# Patient Record
Sex: FEMALE | Race: WHITE | Employment: UNEMPLOYED | URBAN - METROPOLITAN AREA
[De-identification: names, ages, dates, MRNs, and addresses within clinical notes are randomized per-mention and may not be internally consistent; named-entity substitution may affect disease eponyms.]

---

## 2018-09-07 ENCOUNTER — HOSPITAL ENCOUNTER (EMERGENCY)
Age: 38
Discharge: HOME | End: 2018-09-07
Attending: EMERGENCY MEDICINE
Payer: SELF-PAY

## 2018-09-07 VITALS
HEART RATE: 117 BPM | SYSTOLIC BLOOD PRESSURE: 105 MMHG | BODY MASS INDEX: 26.41 KG/M2 | TEMPERATURE: 98.2 F | HEIGHT: 59 IN | OXYGEN SATURATION: 100 % | RESPIRATION RATE: 20 BRPM | DIASTOLIC BLOOD PRESSURE: 65 MMHG | WEIGHT: 131 LBS

## 2018-09-07 DIAGNOSIS — O23.40 URINARY TRACT INFECTION IN MOTHER DURING PREGNANCY, ANTEPARTUM: ICD-10-CM

## 2018-09-07 DIAGNOSIS — R19.7 NAUSEA VOMITING AND DIARRHEA: Primary | ICD-10-CM

## 2018-09-07 DIAGNOSIS — R11.2 NAUSEA VOMITING AND DIARRHEA: Primary | ICD-10-CM

## 2018-09-07 LAB
ALBUMIN SERPL-MCNC: 2.9 G/DL (ref 3.4–5)
ALBUMIN/GLOB SERPL: 0.7 {RATIO} (ref 0.8–1.7)
ALP SERPL-CCNC: 70 U/L (ref 45–117)
ALT SERPL-CCNC: 28 U/L (ref 13–56)
ANION GAP SERPL CALC-SCNC: 12 MMOL/L (ref 3–18)
APPEARANCE UR: CLEAR
AST SERPL-CCNC: 18 U/L (ref 15–37)
BACTERIA URNS QL MICRO: ABNORMAL /HPF
BASOPHILS # BLD: 0 K/UL (ref 0–0.1)
BASOPHILS NFR BLD: 0 % (ref 0–2)
BILIRUB SERPL-MCNC: 0.4 MG/DL (ref 0.2–1)
BILIRUB UR QL: NEGATIVE
BUN SERPL-MCNC: 3 MG/DL (ref 7–18)
BUN/CREAT SERPL: 6 (ref 12–20)
CALCIUM SERPL-MCNC: 8.6 MG/DL (ref 8.5–10.1)
CHLORIDE SERPL-SCNC: 99 MMOL/L (ref 100–108)
CO2 SERPL-SCNC: 25 MMOL/L (ref 21–32)
COLOR UR: YELLOW
CREAT SERPL-MCNC: 0.47 MG/DL (ref 0.6–1.3)
DIFFERENTIAL METHOD BLD: ABNORMAL
EOSINOPHIL # BLD: 0 K/UL (ref 0–0.4)
EOSINOPHIL NFR BLD: 0 % (ref 0–5)
EPITH CASTS URNS QL MICRO: ABNORMAL /LPF (ref 0–5)
ERYTHROCYTE [DISTWIDTH] IN BLOOD BY AUTOMATED COUNT: 13.4 % (ref 11.6–14.5)
GLOBULIN SER CALC-MCNC: 4.3 G/DL (ref 2–4)
GLUCOSE SERPL-MCNC: 102 MG/DL (ref 74–99)
GLUCOSE UR STRIP.AUTO-MCNC: NEGATIVE MG/DL
HCG SERPL-ACNC: ABNORMAL MIU/ML (ref 1–6)
HCT VFR BLD AUTO: 34 % (ref 35–45)
HGB BLD-MCNC: 11.5 G/DL (ref 12–16)
HGB UR QL STRIP: ABNORMAL
KETONES UR QL STRIP.AUTO: NEGATIVE MG/DL
LEUKOCYTE ESTERASE UR QL STRIP.AUTO: ABNORMAL
LIPASE SERPL-CCNC: 147 U/L (ref 73–393)
LYMPHOCYTES # BLD: 1.3 K/UL (ref 0.9–3.6)
LYMPHOCYTES NFR BLD: 12 % (ref 21–52)
MAGNESIUM SERPL-MCNC: 1.8 MG/DL (ref 1.6–2.6)
MCH RBC QN AUTO: 29.6 PG (ref 24–34)
MCHC RBC AUTO-ENTMCNC: 33.8 G/DL (ref 31–37)
MCV RBC AUTO: 87.4 FL (ref 74–97)
MONOCYTES # BLD: 0.7 K/UL (ref 0.05–1.2)
MONOCYTES NFR BLD: 6 % (ref 3–10)
NEUTS SEG # BLD: 8.7 K/UL (ref 1.8–8)
NEUTS SEG NFR BLD: 82 % (ref 40–73)
NITRITE UR QL STRIP.AUTO: NEGATIVE
PH UR STRIP: 7 [PH] (ref 5–8)
PLATELET # BLD AUTO: 283 K/UL (ref 135–420)
PMV BLD AUTO: 9.9 FL (ref 9.2–11.8)
POTASSIUM SERPL-SCNC: 3.9 MMOL/L (ref 3.5–5.5)
PROT SERPL-MCNC: 7.2 G/DL (ref 6.4–8.2)
PROT UR STRIP-MCNC: NEGATIVE MG/DL
RBC # BLD AUTO: 3.89 M/UL (ref 4.2–5.3)
RBC #/AREA URNS HPF: ABNORMAL /HPF (ref 0–5)
SODIUM SERPL-SCNC: 136 MMOL/L (ref 136–145)
SP GR UR REFRACTOMETRY: 1.01 (ref 1–1.03)
UROBILINOGEN UR QL STRIP.AUTO: 1 EU/DL (ref 0.2–1)
WBC # BLD AUTO: 10.6 K/UL (ref 4.6–13.2)
WBC URNS QL MICRO: ABNORMAL /HPF (ref 0–5)

## 2018-09-07 PROCEDURE — 99283 EMERGENCY DEPT VISIT LOW MDM: CPT

## 2018-09-07 PROCEDURE — 83735 ASSAY OF MAGNESIUM: CPT | Performed by: PHYSICIAN ASSISTANT

## 2018-09-07 PROCEDURE — 74011250636 HC RX REV CODE- 250/636: Performed by: PHYSICIAN ASSISTANT

## 2018-09-07 PROCEDURE — 96375 TX/PRO/DX INJ NEW DRUG ADDON: CPT

## 2018-09-07 PROCEDURE — 84702 CHORIONIC GONADOTROPIN TEST: CPT | Performed by: PHYSICIAN ASSISTANT

## 2018-09-07 PROCEDURE — 81001 URINALYSIS AUTO W/SCOPE: CPT | Performed by: PHYSICIAN ASSISTANT

## 2018-09-07 PROCEDURE — 74011000250 HC RX REV CODE- 250: Performed by: PHYSICIAN ASSISTANT

## 2018-09-07 PROCEDURE — 85025 COMPLETE CBC W/AUTO DIFF WBC: CPT | Performed by: PHYSICIAN ASSISTANT

## 2018-09-07 PROCEDURE — 80053 COMPREHEN METABOLIC PANEL: CPT | Performed by: PHYSICIAN ASSISTANT

## 2018-09-07 PROCEDURE — 83690 ASSAY OF LIPASE: CPT | Performed by: PHYSICIAN ASSISTANT

## 2018-09-07 PROCEDURE — 87086 URINE CULTURE/COLONY COUNT: CPT | Performed by: PHYSICIAN ASSISTANT

## 2018-09-07 PROCEDURE — 96361 HYDRATE IV INFUSION ADD-ON: CPT

## 2018-09-07 PROCEDURE — 96374 THER/PROPH/DIAG INJ IV PUSH: CPT

## 2018-09-07 RX ORDER — ONDANSETRON 2 MG/ML
4 INJECTION INTRAMUSCULAR; INTRAVENOUS
Status: COMPLETED | OUTPATIENT
Start: 2018-09-07 | End: 2018-09-07

## 2018-09-07 RX ORDER — CEPHALEXIN 500 MG/1
500 CAPSULE ORAL 2 TIMES DAILY
Qty: 14 CAP | Refills: 0 | Status: SHIPPED | OUTPATIENT
Start: 2018-09-07 | End: 2018-09-14

## 2018-09-07 RX ORDER — ONDANSETRON 4 MG/1
4 TABLET, FILM COATED ORAL
Qty: 12 TAB | Refills: 0 | Status: SHIPPED | OUTPATIENT
Start: 2018-09-07

## 2018-09-07 RX ORDER — FAMOTIDINE 10 MG/ML
20 INJECTION INTRAVENOUS
Status: COMPLETED | OUTPATIENT
Start: 2018-09-07 | End: 2018-09-07

## 2018-09-07 RX ADMIN — SODIUM CHLORIDE, SODIUM LACTATE, POTASSIUM CHLORIDE, AND CALCIUM CHLORIDE 1000 ML: 600; 310; 30; 20 INJECTION, SOLUTION INTRAVENOUS at 18:06

## 2018-09-07 RX ADMIN — FAMOTIDINE 20 MG: 10 INJECTION, SOLUTION INTRAVENOUS at 18:41

## 2018-09-07 RX ADMIN — ONDANSETRON 4 MG: 2 INJECTION INTRAMUSCULAR; INTRAVENOUS at 18:07

## 2018-09-07 NOTE — DISCHARGE INSTRUCTIONS
Gastroenteritis: Instrucciones de cuidado - [ Gastroenteritis: Care Instructions ]  Instrucciones de cuidado    La gastroenteritis es gurwinder enfermedad que puede causar náuseas, vómito y [place]. A veces se la llama \"gastroenteritis viral\". Puede ser causada por gurwinder bacteria o un virus. Es probable que comience a sentirse mejor en 1 a 2 días. Entretanto, descanse mucho y asegúrese de no deshidratarse. La deshidratación ocurre cuando crowe cuerpo pierde demasiado líquido. La atención de seguimiento es gurwinder parte clave de crowe tratamiento y seguridad. Asegúrese de hacer y acudir a todas las citas, y llame a crowe médico si está teniendo problemas. También es gurwinder buena idea saber los resultados de [place] exámenes y mantener gurwinder lista de los medicamentos que artemio. ¿Cómo puede cuidarse en el hogar? · Si crowe médico le recetó antibióticos, tómelos según las indicaciones. No deje de tomarlos por el hecho de sentirse mejor. Debe amanda todos los antibióticos hasta terminarlos. · Yolie abundantes líquidos para prevenir la deshidratación, lo suficiente para que crowe orina sea de color am[place] o transparente olivia el agua. Elija beber agua y otros líquidos jammie sin cafeína hasta que se sienta mejor. Si tiene gurwinder enfermedad del riñón, del corazón o del hígado y tiene que [place] líquidos, hable con crowe médico antes de aumentar crowe consumo. · Yolie los líquidos lentamente, en cantidades pequeñas y frecuentes, ya que beber [employer] muy rápido le puede causar vómito. · Empiece a comer alimentos suaves, olivia pan nish seco, yogur, arroz, bananas (plátanos) y puré de Synchari. Evite los alimentos condimentados, picantes o con gran contenido de [place] y no yolie alcohol ni cafeína any teresa o [address]. No yolie leche ni coma helado hasta que se sienta mejor. Cómo prevenir la gastroenteritis  · [address] comidas calientes, calientes y las frías, frías.   · No consuma flora, aderezos, ensaladas u otros alimentos que [place] permanecido a temperatura ambiente any más de 2 horas. · Utilice un termómetro para verificar la temperatura de crowe refrigerador (nevera). La temperatura debería estar entre 34°F y 40°F (1°C y 4°C). · Descongele la carne en el refrigerador o el microondas, no sobre la encimera. · [address] y la cocina limpias. Lávese las [place], las tablas de picar y las encimeras con frecuencia, con agua jabonosa caliente. · Cocine la carne hasta que esté anthony cocinada. · No coma huevos crudos ni salsas no cocidas hechas con huevos crudos. · No corra riesgos. Si la comida sabe o parece echada a perder, deséchela. ¿Cuándo debe pedir ayuda? Llame al 911 en cualquier momento que considere que necesita atención de emergencia. Por ejemplo, llame si:    · Vomita daysi o algo parecido a granos de café molido.     · Se desmayó (perdió el conocimiento).   · Las heces son de color rojizo o muy sanguinolentas (con daysi).    Llame a crowe médico ahora mismo o busque atención médica inmediata si:    · Tiene dolor intenso en el vientre.     · Tiene señales de necesitar más líquidos. Tiene los ojos hundidos, la boca seca y poca orina de color oscuro.     · Siente olivia si fuera a desmayarse.     · Tiene mayor dolor abdominal que no desaparece en 1 a 2 días.     · Tiene nuevas náuseas o éstas aumentan, o tiene vómito.     · Tiene fiebre nueva o más mindy.     · Sonya heces son negruzcas y parecidas al alquitrán o tienen rastros de daysi.    Preste especial atención a los cambios en crowe sameer y asegúrese de comunicarse con crowe médico si:    · Se siente mareado o aturdido.     · Orina menos de lo habitual o crowe orina es de color amarillento oscuro o amarronado.     · No se siente mejor con cada día que pasa. ¿Dónde puede encontrar más información en inglés? Marley Noss a http://mele-peter.info/.   Escriba [id number] en la búsqueda para aprender más acerca de \"Gastroenteritis: Instrucciones de cuidado - [ Gastroenteritis: Care Instructions ]. \"  Revisado: 18 noviembre, 2017  Versión del contenido: 11.7  © 8623-9548 Healthwise, Incorporated. Las instrucciones de cuidado fueron adaptadas bajo licencia por [employer] (which disclaims liability or warranty for this information). Si usted tiene [place] afección médica o sobre estas instrucciones, siempre pregunte a crowe profesional de sameer. Healthwise, Incorporated niega toda garantía o responsabilidad por crowe uso de esta información. Dieta blanda de textura suave: Instrucciones de cuidado - [ Soft-Textured, [place] Diet: Care Instructions ]  Instrucciones de cuidado    Matteo [place] dieta blanda de textura suave se [place] cuando usted necesita alimentos que yuan fáciles de masticar, tragar y digerir. Deberá elegir alimentos blandos que no estén muy condimentados. Deberá evitar los alimentos altos en grasa, además de la cafeína y el alcohol. Crowe médico o dietista puede ayudarle a planificar gurwinder dieta blanda de textura [place] apropiada para crowe sameer y que sea de crowe gusto. Pregúntele a crowe médico por cuánto tiempo deberá seguir esta dieta. A medida que vaya mejorando, es probable que pueda regresar a crowe dieta habitual. Consulte a crowe médico o dietista antes de hacer cambios en crowe dieta. La atención de seguimiento es gurwinder parte clave de crowe tratamiento y seguridad. Asegúrese de hacer y acudir a todas las citas, y llame a crowe médico si está teniendo problemas. También es gurwinder buena idea saber los resultados de [place] exámenes y mantener gurwinder lista de los medicamentos que artemio. ¿Cómo puede cuidarse en el hogar? · Elija alimentos fáciles de masticar y tragar. Boneta Daring opciones son el puré de papa, el pan y los panecillos blandos, las sopas tipo crema, la marianne y [place] [address]" (crema de dian). · Elija verduras suaves y anthony cocidas, y frutas blandas o enlatadas.  Algunas buenas opciones son el puré de Synchari, las bananas (plátanos) maduras y los jugos de frutas no cítricas. · Pruebe amanda [place], yogur u otros productos lácteos si puede digerirlos sin [employer]. Crowe médico podría limitar por un tiempo el consumo de [place] y [place] derivados. Si es así, podría recomendarle un suplemento de calcio y vitamina D.  · Elija alimentos suaves con proteína, olivia los [place], el tofu, el pescado al vapor, el alex y [place]. Los métodos de cocción lenta, olivia guisar, ayudarán a suavizar la carne. [place] la carne en un procesador de alimentos o gurwinder licuadora también hará que sea más fácil de comer. · Evite las nueces, las verduras crudas, las [address] duras y las flora duras, así olivia las ciruelas pasas y el jugo de ciruelas pasas. · Evite los alimentos muy condimentados, olivia los que se sazonan con filipe cecelia, chiles, rábano picante o salsa picante. · Evite los alimentos muy ácidos, olivia las frutas cítricas y [place] jugos, así olivia los alimentos que [place]. · Evite los alimentos de alto contenido de grasa, olivia la carne frita, los \"chips\" (tipo benitez fritas) y los postres pesados. · Consulte a crowe médico antes de consumir alcohol o bebidas que contengan cafeína, olivia el café, el té y las bebidas cola. ¿Dónde puede encontrar más información en inglés? Katelyn Jimenez a http://mele-peter.info/. Lidia Gonzalez H030 en la búsqueda para aprender más acerca de \"Dieta blanda de textura suave: Instrucciones de cuidado - [ Soft-Textured, [place] Diet: Care Instructions ]. \"  Revisado: [address] [place], 2017  Versión del contenido: 11.7  © 5176-4332 Healthwise, Incorporated. Las instrucciones de cuidado fueron adaptadas bajo licencia por [employer] (which disclaims liability or warranty for this information). Si usted tiene [place] afección médica o sobre estas instrucciones, siempre pregunte a crowe profesional de sameer. Healthwise, Incorporated niega toda garantía o responsabilidad por crowe uso de esta información.          Emily Flies en las mujeres: Instrucciones de cuidado - [ Urinary Tract Infection in Women: Care Instructions ]  Instrucciones de cuidado    Gurwinder infección urinaria (UTI, por [place] siglas en inglés) es un término general que hace referencia a gurwinder infección que se produce en cualquier parte entre los riñones y la uretra (conducto por el cual se expulsa la orina). La mayoría de las UTI son infecciones de la vejiga. Con frecuencia, causan dolor o ardor al [place]. Las UTI son causadas por bacterias y pueden curarse con antibióticos. Asegúrese de completar el tratamiento para que la infección desaparezca. La atención de seguimiento es gurwinder parte clave de crowe tratamiento y seguridad. Asegúrese de hacer y acudir a todas las citas, y llame a crowe médico si está teniendo problemas. También es gurwinder buena idea saber los resultados de [place] exámenes y mantener gurwinder lista de los medicamentos que artemio. ¿Cómo puede cuidarse en el hogar? · [address] No deje de tomarlos por el hecho de sentirse mejor. Debe amanda todos los antibióticos hasta terminarlos. · Charity los próximos teresa o [address], yolie mayor cantidad de [place] y otros líquidos. [place] puede ayudar a eliminar las bacterias que provocan la infección. (Si tiene gurwinder enfermedad de los riñones, el corazón o el hígado y tiene que [place]'s líquidos, hable con crowe médico antes de aumentar crowe consumo). · Evite las bebidas gaseosas o con cafeína. Pueden irritar la vejiga. · Orine con frecuencia. Trate de vaciar la vejiga cada vez que orine. · Para aliviar el dolor, tome un baño caliente o colóquese gurwinder almohadilla térmica a baja temperatura sobre la parte baja del abdomen o la douglas genital. Nunca se duerma mientras [place] gurwinder almohadilla térmica. Para prevenir las infecciones urinarias  · Yolie abundante agua todos los días. [place] la ayuda a orinar con frecuencia, lo que elimina las bacterias de crowe organismo.  (Si tiene [place] enfermedad de los riñones, el corazón o el hígado y tiene [employer] líquidos, hable con crowe médico antes de aumentar crowe consumo). · Orine cuando necesite hacerlo. · Orine inmediatamente después de paul tenido [employer]. · Cámbiese las toallas sanitarias con frecuencia. · Evite el uso de lavados vaginales, los rocio de burbujas, los [place] de higiene femenina y otros productos para la higiene femenina que contengan desodorantes. · Después de ir al baño, límpiese de adelante hacia atrás. ¿Cuándo debes pedir ayuda? Llama a tu médico ahora mismo o busca atención médica inmediata si:    · Aparecen síntomas olivia fiebre, escalofríos, náuseas o vómitos por primera vez, o empeoran.     · Te empieza a doler la espalda, mesha debajo de la caja torácica. A esto se le llama dolor en el flanco.     · Aparece daysi o pus en la orina.     · Tienes problemas con los antibióticos.    Presta especial atención a los cambios en tu sameer y asegúrate de comunicarte con tu médico si:    · No mejoras después de paul tomado un antibiótico ayn 2 días.     · Los síntomas desaparecen y Bernell Cease. ¿Dónde puede encontrar más información en inglés? Marley Noss a http://mele-peter.info/. Yovanny Santillan [id number] en la búsqueda para aprender más acerca de \"Infección urinaria en las mujeres: Instrucciones de cuidado - [ Urinary Tract Infection in Women: Care Instructions ]. \"  Revisado: 12 [place], 2017  Versión del contenido: 11.7  © 0364-3402 HealthLoop, Incorporated. Las instrucciones de cuidado fueron adaptadas bajo licencia por [employer] (which disclaims liability or warranty for this information). Si usted tiene [place] afección médica o sobre estas instrucciones, siempre pregunte a crowe profesional de sameer. [facility] Incorporated niega toda garantía o responsabilidad por crowe uso de esta información.

## 2018-09-07 NOTE — ED NOTES
Bedside and Verbal shift change report given to IVY Davison (oncoming nurse) by Jo Ann Elliott RN 
 (offgoing nurse). Report included the following information SBAR, Kardex, ED Summary, [facility] and Recent Results.

## 2018-09-07 NOTE — ED PROVIDER NOTES
EMERGENCY DEPARTMENT HISTORY AND PHYSICAL EXAM 
 
Date: 9/7/2018 Patient Name: Dilia Harry History of Presenting Illness Chief Complaint Patient presents with  Diarrhea  Vomiting History Provided By: Patient Chief Complaint: Vomiting Duration: 3 Days Timing:  Acute Severity: Mild Modifying Factors: Unable to tolerate PO intake Associated Symptoms: resolved fever, pelvic pain with vomiting, intermittent cramping but not on presentation, HA Additional History (Context):  
4:33 PM 
Dilia Harry is a 41 y.o. female (Sintia Alatorre) who is 3 months pregnant with no relevant PMHx presents to the emergency department C/O vomiting, onset 3 days. . Associated sxs include HA, resolved fever, pelvic pain during vomiting, nausea, intermittent pelvic cramping but not on presentation. Pt is unable to tolerate PO intake as it worsens. Pt has an appointment on 09/26/18 with her Ob Gyn. Pt has a US with documented IUP and she has shown provider the US picture. Pt denies vaginal bleeding, medication allergies, and any other sxs or complaints. PCP: [facility] Current Outpatient Prescriptions Medication Sig Dispense Refill  prenatal vit-calcium-iron-fa (PRENATAL PLUS, CALCIUM CARB,) 27 mg iron- 1 mg tab Take 1 Tab by mouth daily.  cephALEXin (KEFLEX) 500 mg capsule Take 1 Cap by mouth two (2) times a day for 7 days. Indications: BACTERIAL URINARY TRACT INFECTION 14 Cap 0  
 ondansetron hcl (ZOFRAN) 4 mg tablet Take 1 Tab by mouth every eight (8) hours as needed for Nausea. 12 Tab 0 Past History Past Medical History: No past medical history on file. Past Surgical History: No past surgical history on file. Family History: No family history on file. Social History: 
Social History Substance Use Topics  Smoking status: Never Smoker  Smokeless tobacco: Never Used  Alcohol use None Allergies: 
No Known Allergies Review of Systems Review of Systems Constitutional: Positive for fever (resolved). Negative for chills. Respiratory: Negative for cough. Cardiovascular: Negative for chest pain. Gastrointestinal: Positive for abdominal pain (cramping), nausea and vomiting. Genitourinary: Positive for pelvic pain (with vomiting). Negative for dysuria, vaginal bleeding and vaginal discharge. Musculoskeletal: Negative for back pain. Skin: Negative for color change. Neurological: Positive for headaches. Negative for dizziness, weakness and light-headedness. All other systems reviewed and are negative. Physical Exam  
 
Vitals:  
 09/07/18 1621 09/07/18 1856 09/07/18 1900 BP: 108/65 105/60 105/65 Pulse: (!) 117 Resp: 20 Temp: 98.2 °F (36.8 °C) SpO2: 100% Weight: 59.4 kg (131 lb) Height: 4' 11.06\" (1.5 m) Physical Exam  
Constitutional: She is oriented to person, place, and time. She appears well-developed and well-nourished. No distress.  female in NAD. Alert. Appears comfortable. HENT:  
Head: Normocephalic and atraumatic. Right Ear: External ear normal.  
Left Ear: External ear normal.  
Nose: Nose normal.  
Mouth/Throat: Uvula is midline, oropharynx is clear and moist and mucous membranes are normal.  
Eyes: Conjunctivae are normal. No scleral icterus. Neck: Normal range of motion. Cardiovascular: Normal rate, regular rhythm, normal heart sounds and intact distal pulses. Exam reveals no gallop and no friction rub. No murmur heard. Pulmonary/Chest: Effort normal and breath sounds normal. No accessory muscle usage. No tachypnea. No respiratory distress. She has no decreased breath sounds. She has no wheezes. She has no rhonchi. She has no rales. Abdominal: Soft. Normal appearance. She exhibits no mass. There is no tenderness. There is no rigidity, no rebound, no guarding, no CVA tenderness, no tenderness at McBurney's point and negative Foster's sign. Musculoskeletal: Normal range of motion. Neurological: She is alert and oriented to person, place, and time. Skin: Skin is warm and dry. No rash noted. She is not diaphoretic. No erythema. Psychiatric: She has a normal mood and affect. Judgment normal.  
Nursing note and vitals reviewed. Diagnostic Study Results Labs - No results found for this or any previous visit (from the past 12 hour(s)). Radiologic Studies - No orders to display CT Results  (Last 48 hours) None CXR Results  (Last 48 hours) None Medications given in the ED- Medications  
lactated ringers bolus infusion 1,000 mL (0 mL IntraVENous IV Completed 9/7/18 1915)  
ondansetron (ZOFRAN) injection 4 mg (4 mg IntraVENous Given 9/7/18 1807) famotidine (PF) (PEPCID) injection 20 mg (20 mg IntraVENous Given 9/7/18 1841) Medical Decision Making I am the first provider for this patient. I reviewed the vital signs, available nursing notes, past medical history, past surgical history, family history and social history. Vital Signs-Reviewed the patient's vital signs. Pulse Oximetry Analysis - 100% on RA Cardiac Monitor: 
Rate: 117 bpm 
Rhythm: NSR Records Reviewed: Nursing Notes Old Medical Records Provider Notes (Medical Decision Making): gastritis, PUD, GERD, colitis, diverticulitis, gastroenteritis, pancreatitis, hepatitis, food borne, UTI/pyelo. Doubt dissection Procedures: 
Procedures ED Course:  
4:34 PM Initial assessment performed. The patients presenting problems have been discussed, and they are in agreement with the care plan formulated and outlined with them. I have encouraged them to ask questions as they arise throughout their visit. Diagnosis and Disposition Afebrile. Benign abdomen. Labs reassuring. Suspect viral v food borne. UA c/w UTI. No evidence of upper urinary tract involvement. [place] diet.  PCP FU. Reasons to RTED discussed with pt. All questions answered. Pt feels comfortable going home at this time. Pt expressed understanding and she agrees with plan. DISCHARGE NOTE: 
Rashad Billings's  results have been reviewed with her. She has been counseled regarding her diagnosis, treatment, and plan. She verbally conveys understanding and agreement of the signs, symptoms, diagnosis, treatment and prognosis and additionally agrees to follow up as discussed. She also agrees with the care-plan and conveys that all of her questions have been answered. I have also provided discharge instructions for her that include: educational information regarding their diagnosis and treatment, and list of reasons why they would want to return to the ED prior to their follow-up appointment, should her condition change. She has been provided with education for proper emergency department utilization. CLINICAL IMPRESSION: 
 
1. Nausea vomiting and diarrhea 2. Urinary tract infection in mother during pregnancy, antepartum PLAN: 
1. D/C Home 2. Discharge Medication List as of 9/7/2018  6:23 PM  
  
START taking these medications Details  
cephALEXin (KEFLEX) 500 mg capsule Take 1 Cap by mouth two (2) times a day for 7 days. Indications: BACTERIAL URINARY TRACT INFECTION, Print, Disp-14 Cap, R-0  
  
ondansetron hcl (ZOFRAN) 4 mg tablet Take 1 Tab by mouth every eight (8) hours as needed for Nausea. , Print, Disp-12 Tab, R-0  
  
  
CONTINUE these medications which have NOT CHANGED Details  
prenatal vit-calcium-iron-fa (PRENATAL PLUS, CALCIUM CARB,) 27 mg iron- 1 mg tab Take 1 Tab by mouth daily. , Historical Med 3. Follow-up Information Follow up With Details Comments Contact Info [facility]   [address] [place] [address] 
[phone] [facility] EMERGENCY DEPT  As needed, If symptoms worsen [address] [place] [id number] [phone]  
  
 
_______________________________ Attestations: This note is prepared by Claude Lopez, acting as Scribe for [employer] . [employer], JOANA:  The scribe's documentation has been prepared under my direction and personally reviewed by me in its entirety. I confirm that the note above accurately reflects all work, treatment, procedures, and medical decision making performed by me. 
_______________________________

## 2018-09-09 LAB
BACTERIA SPEC CULT: NORMAL
SERVICE CMNT-IMP: NORMAL

## 2018-10-01 ENCOUNTER — HOSPITAL ENCOUNTER (EMERGENCY)
Age: 38
Discharge: HOME | End: 2018-10-01
Attending: EMERGENCY MEDICINE
Payer: SELF-PAY

## 2018-10-01 ENCOUNTER — APPOINTMENT (OUTPATIENT)
Dept: ULTRASOUND IMAGING | Age: 38
End: 2018-10-01
Attending: PHYSICIAN ASSISTANT
Payer: SELF-PAY

## 2018-10-01 VITALS
SYSTOLIC BLOOD PRESSURE: 111 MMHG | WEIGHT: 135 LBS | HEART RATE: 83 BPM | OXYGEN SATURATION: 100 % | BODY MASS INDEX: 28.34 KG/M2 | DIASTOLIC BLOOD PRESSURE: 67 MMHG | TEMPERATURE: 98.3 F | RESPIRATION RATE: 16 BRPM | HEIGHT: 58 IN

## 2018-10-01 VITALS
DIASTOLIC BLOOD PRESSURE: 78 MMHG | WEIGHT: 131 LBS | RESPIRATION RATE: 16 BRPM | SYSTOLIC BLOOD PRESSURE: 117 MMHG | TEMPERATURE: 97.9 F | HEART RATE: 75 BPM | BODY MASS INDEX: 27.38 KG/M2 | OXYGEN SATURATION: 100 %

## 2018-10-01 DIAGNOSIS — O21.9 NAUSEA AND VOMITING IN PREGNANCY: ICD-10-CM

## 2018-10-01 DIAGNOSIS — O21.0 HYPEREMESIS GRAVIDARUM: Primary | ICD-10-CM

## 2018-10-01 DIAGNOSIS — R10.13 ABDOMINAL PAIN, EPIGASTRIC: Primary | ICD-10-CM

## 2018-10-01 DIAGNOSIS — N30.00 ACUTE CYSTITIS WITHOUT HEMATURIA: ICD-10-CM

## 2018-10-01 LAB
ALBUMIN SERPL-MCNC: 2.9 G/DL (ref 3.4–5)
ALBUMIN SERPL-MCNC: 3 G/DL (ref 3.4–5)
ALBUMIN/GLOB SERPL: 0.7 {RATIO} (ref 0.8–1.7)
ALBUMIN/GLOB SERPL: 0.7 {RATIO} (ref 0.8–1.7)
ALP SERPL-CCNC: 67 U/L (ref 45–117)
ALP SERPL-CCNC: 70 U/L (ref 45–117)
ALT SERPL-CCNC: 26 U/L (ref 13–56)
ALT SERPL-CCNC: 26 U/L (ref 13–56)
ANION GAP SERPL CALC-SCNC: 10 MMOL/L (ref 3–18)
ANION GAP SERPL CALC-SCNC: 10 MMOL/L (ref 3–18)
APPEARANCE UR: ABNORMAL
APPEARANCE UR: CLEAR
AST SERPL-CCNC: 14 U/L (ref 15–37)
AST SERPL-CCNC: 15 U/L (ref 15–37)
BACTERIA URNS QL MICRO: ABNORMAL /HPF
BACTERIA URNS QL MICRO: ABNORMAL /HPF
BASOPHILS # BLD: 0 K/UL (ref 0–0.1)
BASOPHILS # BLD: 0 K/UL (ref 0–0.1)
BASOPHILS NFR BLD: 0 % (ref 0–2)
BASOPHILS NFR BLD: 0 % (ref 0–2)
BILIRUB SERPL-MCNC: 0.3 MG/DL (ref 0.2–1)
BILIRUB SERPL-MCNC: 0.3 MG/DL (ref 0.2–1)
BILIRUB UR QL: NEGATIVE
BILIRUB UR QL: NEGATIVE
BUN SERPL-MCNC: 4 MG/DL (ref 7–18)
BUN SERPL-MCNC: 4 MG/DL (ref 7–18)
BUN/CREAT SERPL: 8 (ref 12–20)
BUN/CREAT SERPL: 9 (ref 12–20)
CALCIUM SERPL-MCNC: 8.6 MG/DL (ref 8.5–10.1)
CALCIUM SERPL-MCNC: 8.9 MG/DL (ref 8.5–10.1)
CHLORIDE SERPL-SCNC: 100 MMOL/L (ref 100–108)
CHLORIDE SERPL-SCNC: 101 MMOL/L (ref 100–108)
CO2 SERPL-SCNC: 23 MMOL/L (ref 21–32)
CO2 SERPL-SCNC: 24 MMOL/L (ref 21–32)
COLOR UR: YELLOW
COLOR UR: YELLOW
CREAT SERPL-MCNC: 0.47 MG/DL (ref 0.6–1.3)
CREAT SERPL-MCNC: 0.48 MG/DL (ref 0.6–1.3)
DIFFERENTIAL METHOD BLD: ABNORMAL
DIFFERENTIAL METHOD BLD: ABNORMAL
EOSINOPHIL # BLD: 0.1 K/UL (ref 0–0.4)
EOSINOPHIL # BLD: 0.1 K/UL (ref 0–0.4)
EOSINOPHIL NFR BLD: 1 % (ref 0–5)
EOSINOPHIL NFR BLD: 1 % (ref 0–5)
EPITH CASTS URNS QL MICRO: ABNORMAL /LPF (ref 0–5)
EPITH CASTS URNS QL MICRO: ABNORMAL /LPF (ref 0–5)
ERYTHROCYTE [DISTWIDTH] IN BLOOD BY AUTOMATED COUNT: 13.4 % (ref 11.6–14.5)
ERYTHROCYTE [DISTWIDTH] IN BLOOD BY AUTOMATED COUNT: 13.6 % (ref 11.6–14.5)
GLOBULIN SER CALC-MCNC: 3.9 G/DL (ref 2–4)
GLOBULIN SER CALC-MCNC: 4.3 G/DL (ref 2–4)
GLUCOSE SERPL-MCNC: 85 MG/DL (ref 74–99)
GLUCOSE SERPL-MCNC: 91 MG/DL (ref 74–99)
GLUCOSE UR STRIP.AUTO-MCNC: NEGATIVE MG/DL
GLUCOSE UR STRIP.AUTO-MCNC: NEGATIVE MG/DL
HCT VFR BLD AUTO: 35.4 % (ref 35–45)
HCT VFR BLD AUTO: 36.4 % (ref 35–45)
HGB BLD-MCNC: 11.9 G/DL (ref 12–16)
HGB BLD-MCNC: 12.2 G/DL (ref 12–16)
HGB UR QL STRIP: NEGATIVE
HGB UR QL STRIP: NEGATIVE
KETONES UR QL STRIP.AUTO: NEGATIVE MG/DL
KETONES UR QL STRIP.AUTO: NEGATIVE MG/DL
LEUKOCYTE ESTERASE UR QL STRIP.AUTO: ABNORMAL
LEUKOCYTE ESTERASE UR QL STRIP.AUTO: ABNORMAL
LIPASE SERPL-CCNC: 145 U/L (ref 73–393)
LYMPHOCYTES # BLD: 1.4 K/UL (ref 0.9–3.6)
LYMPHOCYTES # BLD: 1.5 K/UL (ref 0.9–3.6)
LYMPHOCYTES NFR BLD: 16 % (ref 21–52)
LYMPHOCYTES NFR BLD: 16 % (ref 21–52)
MAGNESIUM SERPL-MCNC: 1.8 MG/DL (ref 1.6–2.6)
MCH RBC QN AUTO: 29.5 PG (ref 24–34)
MCH RBC QN AUTO: 29.5 PG (ref 24–34)
MCHC RBC AUTO-ENTMCNC: 33.5 G/DL (ref 31–37)
MCHC RBC AUTO-ENTMCNC: 33.6 G/DL (ref 31–37)
MCV RBC AUTO: 87.8 FL (ref 74–97)
MCV RBC AUTO: 88.1 FL (ref 74–97)
MONOCYTES # BLD: 0.5 K/UL (ref 0.05–1.2)
MONOCYTES # BLD: 0.5 K/UL (ref 0.05–1.2)
MONOCYTES NFR BLD: 5 % (ref 3–10)
MONOCYTES NFR BLD: 6 % (ref 3–10)
NEUTS SEG # BLD: 6.9 K/UL (ref 1.8–8)
NEUTS SEG # BLD: 7 K/UL (ref 1.8–8)
NEUTS SEG NFR BLD: 77 % (ref 40–73)
NEUTS SEG NFR BLD: 78 % (ref 40–73)
NITRITE UR QL STRIP.AUTO: NEGATIVE
NITRITE UR QL STRIP.AUTO: NEGATIVE
PH UR STRIP: 8 [PH] (ref 5–8)
PH UR STRIP: 8.5 [PH] (ref 5–8)
PLATELET # BLD AUTO: 278 K/UL (ref 135–420)
PLATELET # BLD AUTO: 289 K/UL (ref 135–420)
PMV BLD AUTO: 10 FL (ref 9.2–11.8)
PMV BLD AUTO: 9.8 FL (ref 9.2–11.8)
POTASSIUM SERPL-SCNC: 3.6 MMOL/L (ref 3.5–5.5)
POTASSIUM SERPL-SCNC: 3.7 MMOL/L (ref 3.5–5.5)
PROT SERPL-MCNC: 6.8 G/DL (ref 6.4–8.2)
PROT SERPL-MCNC: 7.3 G/DL (ref 6.4–8.2)
PROT UR STRIP-MCNC: ABNORMAL MG/DL
PROT UR STRIP-MCNC: NEGATIVE MG/DL
RBC # BLD AUTO: 4.03 M/UL (ref 4.2–5.3)
RBC # BLD AUTO: 4.13 M/UL (ref 4.2–5.3)
RBC #/AREA URNS HPF: ABNORMAL /HPF (ref 0–5)
RBC #/AREA URNS HPF: ABNORMAL /HPF (ref 0–5)
SODIUM SERPL-SCNC: 134 MMOL/L (ref 136–145)
SODIUM SERPL-SCNC: 134 MMOL/L (ref 136–145)
SP GR UR REFRACTOMETRY: 1.02 (ref 1–1.03)
SP GR UR REFRACTOMETRY: <1.005 (ref 1–1.03)
UROBILINOGEN UR QL STRIP.AUTO: 0.2 EU/DL (ref 0.2–1)
UROBILINOGEN UR QL STRIP.AUTO: 1 EU/DL (ref 0.2–1)
WBC # BLD AUTO: 8.9 K/UL (ref 4.6–13.2)
WBC # BLD AUTO: 9 K/UL (ref 4.6–13.2)
WBC URNS QL MICRO: ABNORMAL /HPF (ref 0–5)
WBC URNS QL MICRO: ABNORMAL /HPF (ref 0–5)

## 2018-10-01 PROCEDURE — 96361 HYDRATE IV INFUSION ADD-ON: CPT

## 2018-10-01 PROCEDURE — 80053 COMPREHEN METABOLIC PANEL: CPT | Performed by: EMERGENCY MEDICINE

## 2018-10-01 PROCEDURE — 81001 URINALYSIS AUTO W/SCOPE: CPT | Performed by: EMERGENCY MEDICINE

## 2018-10-01 PROCEDURE — 83735 ASSAY OF MAGNESIUM: CPT | Performed by: PHYSICIAN ASSISTANT

## 2018-10-01 PROCEDURE — 99283 EMERGENCY DEPT VISIT LOW MDM: CPT

## 2018-10-01 PROCEDURE — 96374 THER/PROPH/DIAG INJ IV PUSH: CPT

## 2018-10-01 PROCEDURE — 85025 COMPLETE CBC W/AUTO DIFF WBC: CPT | Performed by: EMERGENCY MEDICINE

## 2018-10-01 PROCEDURE — 74011250636 HC RX REV CODE- 250/636: Performed by: EMERGENCY MEDICINE

## 2018-10-01 PROCEDURE — 81001 URINALYSIS AUTO W/SCOPE: CPT | Performed by: PHYSICIAN ASSISTANT

## 2018-10-01 PROCEDURE — 96365 THER/PROPH/DIAG IV INF INIT: CPT

## 2018-10-01 PROCEDURE — 76705 ECHO EXAM OF ABDOMEN: CPT

## 2018-10-01 PROCEDURE — 74011000258 HC RX REV CODE- 258: Performed by: EMERGENCY MEDICINE

## 2018-10-01 PROCEDURE — 83690 ASSAY OF LIPASE: CPT | Performed by: PHYSICIAN ASSISTANT

## 2018-10-01 PROCEDURE — 80053 COMPREHEN METABOLIC PANEL: CPT | Performed by: PHYSICIAN ASSISTANT

## 2018-10-01 PROCEDURE — 74011250636 HC RX REV CODE- 250/636: Performed by: PHYSICIAN ASSISTANT

## 2018-10-01 RX ORDER — FAMOTIDINE 20 MG/1
20 TABLET, FILM COATED ORAL
Qty: 10 TAB | Refills: 0 | Status: SHIPPED | OUTPATIENT
Start: 2018-10-01 | End: 2018-10-11

## 2018-10-01 RX ORDER — FAMOTIDINE 10 MG/ML
20 INJECTION INTRAVENOUS
Status: COMPLETED | OUTPATIENT
Start: 2018-10-01 | End: 2018-10-01

## 2018-10-01 RX ORDER — ACETAMINOPHEN 325 MG/1
650 TABLET ORAL
Qty: 20 TAB | Refills: 0 | OUTPATIENT
Start: 2018-10-01 | End: 2020-08-10

## 2018-10-01 RX ORDER — AMOXICILLIN 500 MG/1
500 TABLET, FILM COATED ORAL 3 TIMES DAILY
Qty: 21 TAB | Refills: 0 | Status: SHIPPED | OUTPATIENT
Start: 2018-10-01

## 2018-10-01 RX ORDER — PROMETHAZINE HYDROCHLORIDE 25 MG/1
25 TABLET ORAL
Qty: 12 TAB | Refills: 0 | Status: SHIPPED | OUTPATIENT
Start: 2018-10-01

## 2018-10-01 RX ADMIN — SODIUM CHLORIDE 1000 ML: 900 INJECTION, SOLUTION INTRAVENOUS at 20:39

## 2018-10-01 RX ADMIN — FAMOTIDINE 20 MG: 10 INJECTION, SOLUTION INTRAVENOUS at 20:40

## 2018-10-01 RX ADMIN — PROMETHAZINE HYDROCHLORIDE 12.5 MG: 25 INJECTION, SOLUTION INTRAMUSCULAR; INTRAVENOUS at 06:52

## 2018-10-01 RX ADMIN — SODIUM CHLORIDE 1000 ML: 900 INJECTION, SOLUTION INTRAVENOUS at 06:42

## 2018-10-01 NOTE — ED NOTES
I have reviewed discharge instructions with the patient and spouse. The patient and spouse verbalized understanding. Patient armband removed and shredded

## 2018-10-01 NOTE — DISCHARGE INSTRUCTIONS
Infección urinaria en las mujeres: Instrucciones de cuidado - [ Urinary Tract Infection in Women: Care Instructions ]  Instrucciones de cuidado    Gurwinder infección urinaria (UTI, por [place] siglas en inglés) es un término general que hace referencia a gurwidner infección que se produce en cualquier parte entre los riñones y la uretra (conducto por el cual se expulsa la orina). La mayoría de las UTI son infecciones de la vejiga. Con frecuencia, causan dolor o ardor al [place]. Las UTI son causadas por bacterias y pueden curarse con antibióticos. Asegúrese de completar el tratamiento para que la infección desaparezca. La atención de seguimiento es gurwinder parte clave de crowe tratamiento y seguridad. Asegúrese de hacer y acudir a todas las citas, y llame a crowe médico si está teniendo problemas. También es gurwinder buena idea saber los resultados de [place] exámenes y mantener gurwinder lista de los medicamentos que artemio. ¿Cómo puede cuidarse en el hogar? · [address] No deje de tomarlos por el hecho de sentirse mejor. Debe amanda todos los antibióticos hasta terminarlos. · Charity los próximos teresa o [address], yolie mayor cantidad de [place] y otros líquidos. Marianne puede ayudar a eliminar las bacterias que provocan la infección. (Si tiene gurwinder enfermedad de los riñones, el corazón o el hígado y tiene que [place]'s líquidos, hable con crowe médico antes de aumentar crowe consumo). · Evite las bebidas gaseosas o con cafeína. Pueden irritar la vejiga. · Orine con frecuencia. Trate de vaciar la vejiga cada vez que orine. · Para aliviar el dolor, tome un baño caliente o colóquese gurwinder almohadilla térmica a baja temperatura sobre la parte baja del abdomen o la douglas genital. Nunca se duerma mientras [place] gurwinder almohadilla térmica. Para prevenir las infecciones urinarias  · Yolie abundante agua todos los días. Marianne la ayuda a orinar con frecuencia, lo que elimina las bacterias de crowe organismo.  (Si tiene [employer] riñones, el corazón o el hígado y tiene que [place]'s líquidos, hable con crowe médico antes de aumentar crowe consumo). · Orine cuando necesite hacerlo. · Orine inmediatamente después de paul tenido [employer]. · Cámbiese las toallas sanitarias con frecuencia. · Evite el uso de lavados vaginales, los rocio de burbujas, los [place] de higiene femenina y otros productos para la higiene femenina que contengan desodorantes. · Después de ir al baño, límpiese de adelante hacia atrás. ¿Cuándo debes pedir ayuda? Llama a tu médico ahora mismo o busca atención médica inmediata si:    · Aparecen síntomas olivia fiebre, escalofríos, náuseas o vómitos por primera vez, o empeoran.     · Te empieza a doler la espalda, mesha debajo de la caja torácica. A esto se le llama dolor en el flanco.     · Aparece daysi o pus en la orina.     · Tienes problemas con los antibióticos.    Presta especial atención a los cambios en tu sameer y asegúrate de comunicarte con tu médico si:    · No mejoras después de paul tomado un antibiótico any 2 días.     · Los síntomas desaparecen y Billie Mackay. ¿Dónde puede encontrar más información en inglés? [place] a http://mele-peter.info/. Cande Behzad H536 en la búsqueda para aprender más acerca de \"Infección urinaria en las mujeres: Instrucciones de cuidado - [ Urinary Tract Infection in Women: Care Instructions ]. \"  Revisado: 12 Jaffrey, 2017  Versión del contenido: 11.7  © 3643-5012 [employer]. Las instrucciones de cuidado fueron adaptadas bajo licencia por [employer] (which disclaims liability or warranty for this information). Si usted tiene [place] afección médica o sobre estas instrucciones, siempre pregunte a crowe profesional de sameer. [facility] niega toda garantía o responsabilidad por crowe uso de esta información. Náuseas y vómito excesivos any el embarazo:  Instrucciones de cuidado - [ Extreme Nausea and Vomiting in Pregnancy: Care Instructions ]  Instrucciones de [address] náuseas y el vómito (conocidos olivia náuseas matutinas) son frecuentes en el [facility]. Son causados por las hormonas del [facility] y se producen con más frecuencia any los 3 primeros meses. Algunas mujeres se enferman mucho y no pueden retener líquidos ni alimentos sólidos en el estómago. Estos náuseas y vómito excesivos any el embarazo se llaman hiperemesis gravídica. Pueden causar gurwinder gran pérdida de líquidos en el cuerpo. También pueden impedir que aumente de peso y que crowe nutrición sea la adecuada any el [facility]. Los líquidos corporales se vuelven a equilibrar con agua y minerales llamados electrolitos. Existen medicamentos que la pueden ayudar si tiene muchas náuseas y vómito. La atención de seguimiento es gurwinder parte clave de crowe tratamiento y seguridad. Asegúrese de hacer y acudir a todas las citas, y llame a crowe médico si está teniendo problemas. También es gurwinder buena idea saber los resultados de [place] exámenes y mantener gurwinder lista de los medicamentos que artemio. ¿Cómo puede cuidarse en el hogar? · [place] los medicamentos exactamente según las indicaciones. Llame a crowe médico si malcolm estar teniendo un problema con crowe medicamento. · Yolie abundante líquido para prevenir la deshidratación. Elija beber agua y otros líquidos jammie sin cafeína hasta que se sienta mejor. [place] sorbos de bebidas deportivas que contienen sal y azúcar. · Coma un refrigerio pequeño, olivia galletas, antes de levantarse de la cama. Espere unos minutos y [employer]. · Tenga comida en el estómago, miguel no demasiada a la vez. [address] náuseas. Coma varias comidas pequeñas cada día, en lugar de chau comidas grandes. · Consuma más proteínas y Day. · Coma muchos alimentos que contengan vitamina B6, olivia granos integrales, nueces, semillas y legumbres.  Puede amanda suplementos de vitamina B6 si crowe médico lo autoriza. · Trate de evitar los olores o las comidas que le producen revoltura estomacal.  · Descanse mucho. · Adamaris Chine utilizar bandas de acupresión. Éstas hacen presión en un punto de acupresión de Ask Ziggy. Algunas mujeres se sienten mejor cuando utilizan estas bandas. · El jengibre también puede ayudarla a sentirse mejor. Puede hacer té de jengibre, tomarlo en pastillas o en jarabe, que puede comprar en gurwinder anna de productos naturales. ¿Cuándo debe pedir ayuda? Llame al 911 en cualquier momento que considere que necesita atención de emergencia. Por ejemplo, llame si:    · Se desmayó (perdió el conocimiento).    Llame a crowe médico ahora mismo o busque atención médica inmediata si:    · Vomita más de 3 veces al día, en especial, si también tiene fiebre o dolor.     · Tiene demasiadas náuseas olivia para beber cualquier tipo de líquido.     · Tiene señales de necesitar más líquidos. Tiene los ojos hundidos, la boca seca y [place] solo poca cantidad de color oscuro.     · Las náuseas del embarazo empeoran o no mejoran con los cuidados en el hogar.     · No puede retener [place] medicamentos en el estómago.    Preste especial atención a los cambios en crowe sameer y asegúrese de comunicarse con crowe médico si tiene algún problema. ¿Dónde puede encontrar más información en inglés? Amrita Pickett a http://mele-peter.info/. Igor Due [id number] en la búsqueda para aprender más acerca de \"Náuseas y vómito excesivos any el embarazo: Instrucciones de cuidado - [ Extreme Nausea and Vomiting in Pregnancy: Care Instructions ]. \"  Revisado: 21 noviembre, 2017  Versión del contenido: 11.7  © 3243-1320 [employer]. Las instrucciones de cuidado fueron adaptadas bajo licencia por [employer] (which disclaims liability or warranty for this information). Si usted tiene [place] afección médica o sobre estas instrucciones, siempre pregunte a crowe profesional de sameer.  [employer] niega toda garantía o responsabilidad por crowe uso de esta información.

## 2018-10-01 NOTE — ED PROVIDER NOTES
EMERGENCY DEPARTMENT HISTORY AND PHYSICAL EXAM 
 
Date: 10/1/2018 Patient Name: Melva Olson History of Presenting Illness Chief Complaint Patient presents with  Vomiting  Abdominal Pain History Provided By: Patient Chief Complaint: abd pain Duration: 2 Days Timing:  Intermittent Quality: Aching Severity: 9 out of 10 Modifying Factors: No modifying factors Associated Symptoms: vomiting (1 episode) and nausea Additional History (Context):  
6:29 AM 
Melva Olson is a 12 weeks pregnant 41 y.o. female with no pertinent PMHx presents to the emergency department C/O intermittent abd pain onset 2 days. Associated sxs include nausea and vomiting (1 episode). Pt reports having abd pain and vomiting, pt has vomited once today, 30 minutes ago and is unable to tolerate PO, last ate yesterday. Pt was seen at [facility] recently for the same sxs. Pt is A1. Pt's intrauterine pregnancy has been verified via US. Pt denies diarrhea, and any other sxs or complaints. PCP: None Current Outpatient Prescriptions Medication Sig Dispense Refill  amoxicillin 500 mg tab Take 500 mg by mouth three (3) times daily. 21 Tab 0  promethazine (PHENERGAN) 25 mg tablet Take 1 Tab by mouth every six (6) hours as needed for Nausea. Caution: This medication may make you drowsy. Avoid driving while under the influence of this medication. 12 Tab 0  prenatal vit-calcium-iron-fa (PRENATAL PLUS, CALCIUM CARB,) 27 mg iron- 1 mg tab Take 1 Tab by mouth daily.  ondansetron hcl (ZOFRAN) 4 mg tablet Take 1 Tab by mouth every eight (8) hours as needed for Nausea. 12 Tab 0 Past History Past Medical History: 
History reviewed. No pertinent past medical history. Past Surgical History: 
History reviewed. No pertinent surgical history. Family History: 
History reviewed. No pertinent family history. Social History: 
Social History Substance Use Topics  Smoking status: Never Smoker  Smokeless tobacco: Never Used  Alcohol use None Allergies: 
No Known Allergies Review of Systems Review of Systems Constitutional: Negative for chills and fever. HENT: Negative for congestion and sore throat. Respiratory: Negative for cough and shortness of breath. Cardiovascular: Negative for chest pain. Gastrointestinal: Positive for abdominal pain, nausea and vomiting. Negative for abdominal distention and diarrhea. Genitourinary: Negative for difficulty urinating, dysuria, flank pain, frequency, hematuria, urgency, vaginal bleeding and vaginal discharge. Musculoskeletal: Negative for arthralgias and joint swelling. Skin: Negative for rash and wound. Neurological: Negative for dizziness, weakness, light-headedness and headaches. Hematological: Negative for adenopathy. All other systems reviewed and are negative. Physical Exam  
 
Vitals:  
 10/01/18 0653 10/01/18 7127 BP: 111/67 Pulse: 83 Resp: 16 Temp: 98.3 °F (36.8 °C) SpO2: 100% 100% Weight: 61.2 kg (135 lb) Height: 4' 10\" (1.473 m) Physical Exam  
Constitutional: She is oriented to person, place, and time. She appears well-developed and well-nourished. No distress. Appears in no apparent distress HENT:  
Head: Normocephalic and atraumatic. Right Ear: External ear normal. No swelling or tenderness. Tympanic membrane is not perforated, not erythematous and not bulging. Left Ear: External ear normal. No swelling or tenderness. Tympanic membrane is not perforated, not erythematous and not bulging. Nose: Nose normal. No mucosal edema or rhinorrhea. Right sinus exhibits no maxillary sinus tenderness and no frontal sinus tenderness. Left sinus exhibits no maxillary sinus tenderness and no frontal sinus tenderness.   
Mouth/Throat: Uvula is midline, oropharynx is clear and moist and mucous membranes are normal. No oral lesions. No trismus in the jaw. No dental abscesses or uvula swelling. No oropharyngeal exudate, posterior oropharyngeal edema, posterior oropharyngeal erythema or tonsillar abscesses. Eyes: Conjunctivae are normal. Right eye exhibits no discharge. Left eye exhibits no discharge. No scleral icterus. Neck: Normal range of motion. Neck supple. Cardiovascular: Normal rate, regular rhythm, normal heart sounds and intact distal pulses. Exam reveals no gallop and no friction rub. No murmur heard. Pulmonary/Chest: Effort normal and breath sounds normal. No accessory muscle usage. No tachypnea. No respiratory distress. She has no decreased breath sounds. She has no wheezes. She has no rhonchi. She has no rales. Abdominal: Soft. There is tenderness in the epigastric area. Musculoskeletal: Normal range of motion. She exhibits no edema or tenderness. Lymphadenopathy:  
  She has no cervical adenopathy. Neurological: She is alert and oriented to person, place, and time. Skin: Skin is warm and dry. No rash noted. She is not diaphoretic. No erythema. Psychiatric: She has a normal mood and affect. Judgment normal.  
Nursing note and vitals reviewed. Diagnostic Study Results Labs - Recent Results (from the past 12 hour(s)) URINALYSIS W/ RFLX MICROSCOPIC Collection Time: 10/01/18  6:29 AM  
Result Value Ref Range Color YELLOW Appearance CLOUDY Specific gravity 1.024 1.005 - 1.030    
 pH (UA) 8.5 (H) 5.0 - 8.0 Protein TRACE (A) NEG mg/dL Glucose NEGATIVE  NEG mg/dL Ketone NEGATIVE  NEG mg/dL Bilirubin NEGATIVE  NEG Blood NEGATIVE  NEG Urobilinogen 1.0 0.2 - 1.0 EU/dL Nitrites NEGATIVE  NEG Leukocyte Esterase MODERATE (A) NEG URINE MICROSCOPIC ONLY Collection Time: 10/01/18  6:29 AM  
Result Value Ref Range WBC 30 to 40 0 - 5 /hpf  
 RBC 6 to 8 0 - 5 /hpf Epithelial cells 15 to 20 0 - 5 /lpf Bacteria 1+ (A) NEG /hpf  
CBC WITH AUTOMATED DIFF Collection Time: 10/01/18  6:40 AM  
Result Value Ref Range WBC 8.9 4.6 - 13.2 K/uL  
 RBC 4.03 (L) 4.20 - 5.30 M/uL  
 HGB 11.9 (L) 12.0 - 16.0 g/dL HCT 35.4 35.0 - 45.0 % MCV 87.8 74.0 - 97.0 FL  
 MCH 29.5 24.0 - 34.0 PG  
 MCHC 33.6 31.0 - 37.0 g/dL  
 RDW 13.4 11.6 - 14.5 % PLATELET 449 516 - 857 K/uL MPV 10.0 9.2 - 11.8 FL  
 NEUTROPHILS 78 (H) 40 - 73 % LYMPHOCYTES 16 (L) 21 - 52 % MONOCYTES 5 3 - 10 % EOSINOPHILS 1 0 - 5 % BASOPHILS 0 0 - 2 %  
 ABS. NEUTROPHILS 6.9 1.8 - 8.0 K/UL  
 ABS. LYMPHOCYTES 1.4 0.9 - 3.6 K/UL  
 ABS. MONOCYTES 0.5 0.05 - 1.2 K/UL  
 ABS. EOSINOPHILS 0.1 0.0 - 0.4 K/UL  
 ABS. BASOPHILS 0.0 0.0 - 0.1 K/UL  
 DF AUTOMATED METABOLIC PANEL, COMPREHENSIVE Collection Time: 10/01/18  6:40 AM  
Result Value Ref Range Sodium 134 (L) 136 - 145 mmol/L Potassium 3.6 3.5 - 5.5 mmol/L Chloride 101 100 - 108 mmol/L  
 CO2 23 21 - 32 mmol/L Anion gap 10 3.0 - 18 mmol/L Glucose 85 74 - 99 mg/dL BUN 4 (L) 7.0 - 18 MG/DL Creatinine 0.48 (L) 0.6 - 1.3 MG/DL  
 BUN/Creatinine ratio 8 (L) 12 - 20 GFR est AA >60 >60 ml/min/1.73m2 GFR est non-AA >60 >60 ml/min/1.73m2 Calcium 8.6 8.5 - 10.1 MG/DL Bilirubin, total 0.3 0.2 - 1.0 MG/DL  
 ALT (SGPT) 26 13 - 56 U/L  
 AST (SGOT) 14 (L) 15 - 37 U/L Alk. phosphatase 67 45 - 117 U/L Protein, total 6.8 6.4 - 8.2 g/dL Albumin 2.9 (L) 3.4 - 5.0 g/dL Globulin 3.9 2.0 - 4.0 g/dL A-G Ratio 0.7 (L) 0.8 - 1.7 Radiologic Studies - No orders to display CT Results  (Last 48 hours) None CXR Results  (Last 48 hours) None Medical Decision Making I am the first provider for this patient. I reviewed the vital signs, available nursing notes, past medical history, past surgical history, family history and social history. Vital Signs-Reviewed the patient's vital signs. Pulse Oximetry Analysis - 100% on RA Cardiac Monitor: 
Rate: 83 bpm 
Rhythm: NSR Records Reviewed: Nursing Notes and Old Medical Records Provider Notes (Medical Decision Making): DDx: initial impression is nausea vomiting and epigastric pain. Need to rule out GERD, hyperemesis gravidarum, gallstones, viral illness, UTI, threatened miscarriage, electrolyte abnormality Procedures: 
Procedures ED Course:  
6:29 AM Initial assessment performed. The patients presenting problems have been discussed, and they are in agreement with the care plan formulated and outlined with them. I have encouraged them to ask questions as they arise throughout their visit. 7:04 AM 
Patient's presentation, labs/imaging and plan of care was reviewed with Kodak Croft MD as part of sign out. They will review labs as part of the plan discussed with the patient. Kodak Croft MD's assistance in completion of this plan is greatly appreciated but it should be noted that I will be the provider of record for this patient. Written by Edu Oliveira ED Scribe, as dictated by Sari Mills MD. Diagnosis and Disposition DISCHARGE NOTE: 
7:41 AM 
Brenda Billings's  results have been reviewed with her. She has been counseled regarding her diagnosis, treatment, and plan. She verbally conveys understanding and agreement of the signs, symptoms, diagnosis, treatment and prognosis and additionally agrees to follow up as discussed. She also agrees with the care-plan and conveys that all of her questions have been answered. I have also provided discharge instructions for her that include: educational information regarding their diagnosis and treatment, and list of reasons why they would want to return to the ED prior to their follow-up appointment, should her condition change. She has been provided with education for proper emergency department utilization.   
 
CLINICAL IMPRESSION: 
 
 1. Hyperemesis gravidarum 2. Acute cystitis without hematuria Discussion: PLAN: 
1. D/C Home 2. Current Discharge Medication List  
  
START taking these medications Details  
amoxicillin 500 mg tab Take 500 mg by mouth three (3) times daily. Qty: 21 Tab, Refills: 0  
  
promethazine (PHENERGAN) 25 mg tablet Take 1 Tab by mouth every six (6) hours as needed for Nausea. Caution: This medication may make you drowsy. Avoid driving while under the influence of this medication. Qty: 12 Tab, Refills: 0  
  
  
 
3. Follow-up Information Follow up With Details Comments Contact Info Shanon Ybarra MD Schedule an appointment as soon as possible for a visit For OB/GYN Follow Up [address] [place] [address] 
[phone] [facility] EMERGENCY DEPT Go to If symptoms worsen, As needed [address] [place] [id number] 
[phone]  
  
 
_______________________________ Attestations: This note is prepared by Padmini Jules and Eric Noel, acting as Scribe for Arnav Noel MD. Arnav Noel MD:  The scribe's documentation has been prepared under my direction and personally reviewed by me in its entirety. I confirm that the note above accurately reflects all work, treatment, procedures, and medical decision making performed by me. 
_______________________________

## 2018-10-01 NOTE — ED PROVIDER NOTES
EMERGENCY DEPARTMENT HISTORY AND PHYSICAL EXAM 
 
Date: 10/1/2018 Patient Name: Rosie River History of Presenting Illness Chief Complaint Patient presents with  Abdominal Pain History Provided By: Patient (translated from [address] by son) Chief Complaint: Abdominal Pain Duration: 3 Days Timing:  Progressive Location: Mid/upper abdomen radiating to her lower back Quality: Aching Severity: 9 out of 10 Modifying Factors: Pain is relieved after vomiting but exacerbated while eating beans and cheese. Associated Symptoms: vomiting and vaginal bleeding (resolved) Additional History (Context):  
7:42 PM 
Rosie River is a 41 y.o. female with PMHX of cystitis and hyperemesis gravidarum who presents to the emergency department C/O progressive 9/10 aching mid/upper abdominal pain radiating to her lower back onset 3 days ago. Pain is relieved after vomiting but exacerbated while eating. Associated sxs include vomiting, 3 episodes today. Patient is 16 weeks pregnant (). Pt was here earlier today and diagnosed with hyperemesis gravidarum. Reports that she had problems with her gallbladder during child birth with older son but she did not have surgery. Pt cannot remember her LBM, notes that she has a lot of flatulence. Pt denies fevers, diarrhea, vaginal bleeding, gushes of fluid, urinary sx and any other sxs or complaints. Currently on abx for UTI. PCP: None Current Outpatient Prescriptions Medication Sig Dispense Refill  famotidine (PEPCID) 20 mg tablet Take 1 Tab by mouth once over twenty-four (24) hours for 10 days. 10 Tab 0  
 acetaminophen (TYLENOL) 325 mg tablet Take 2 Tabs by mouth every four (4) hours as needed for Pain. 20 Tab 0  
 amoxicillin 500 mg tab Take 500 mg by mouth three (3) times daily. 21 Tab 0  promethazine (PHENERGAN) 25 mg tablet Take 1 Tab by mouth every six (6) hours as needed for Nausea. Caution: This medication may make you drowsy. Avoid driving while under the influence of this medication. 12 Tab 0  prenatal vit-calcium-iron-fa (PRENATAL PLUS, CALCIUM CARB,) 27 mg iron- 1 mg tab Take 1 Tab by mouth daily.  ondansetron hcl (ZOFRAN) 4 mg tablet Take 1 Tab by mouth every eight (8) hours as needed for Nausea. 12 Tab 0 Past History Past Medical History: 
Past Medical History:  
Diagnosis Date  Cystitis  Hyperemesis gravidarum Past Surgical History: No past surgical history on file. Family History: No family history on file. Social History: 
Social History Substance Use Topics  Smoking status: Never Smoker  Smokeless tobacco: Never Used  Alcohol use Not on file Allergies: 
No Known Allergies Review of Systems Review of Systems Constitutional: Negative for fever. Gastrointestinal: Positive for abdominal pain and nausea. Negative for diarrhea. Genitourinary: Negative for dysuria and vaginal bleeding. All other systems reviewed and are negative. Physical Exam  
 
Vitals:  
 10/01/18 1901 BP: 117/78 Pulse: 75 Resp: 16 Temp: 97.9 °F (36.6 °C) SpO2: 100% Weight: 59.4 kg (131 lb) Physical Exam  
Constitutional: She appears well-developed and well-nourished. No distress. HENT:  
Head: Normocephalic and atraumatic. Right Ear: External ear normal.  
Left Ear: External ear normal.  
Nose: Nose normal.  
Eyes: Conjunctivae are normal.  
Neck: Normal range of motion. Cardiovascular: Normal rate, regular rhythm and normal heart sounds. Pulmonary/Chest: Effort normal and breath sounds normal. No respiratory distress. She has no wheezes. Abdominal: Soft. Bowel sounds are normal. She exhibits no distension. There is tenderness. There is no rebound and no guarding. RUQ and epigastric TTP without rebound or guarding. Neurological: She is alert. Skin: Skin is warm and dry. She is not diaphoretic. Psychiatric: She has a normal mood and affect. Nursing note and vitals reviewed. Diagnostic Study Results Labs - Recent Results (from the past 12 hour(s)) URINALYSIS W/ RFLX MICROSCOPIC Collection Time: 10/01/18  7:30 PM  
Result Value Ref Range Color YELLOW Appearance CLEAR Specific gravity <1.005 (L) 1.005 - 1.030  
 pH (UA) 8.0 5.0 - 8.0 Protein NEGATIVE  NEG mg/dL Glucose NEGATIVE  NEG mg/dL Ketone NEGATIVE  NEG mg/dL Bilirubin NEGATIVE  NEG Blood NEGATIVE  NEG Urobilinogen 0.2 0.2 - 1.0 EU/dL Nitrites NEGATIVE  NEG Leukocyte Esterase MODERATE (A) NEG URINE MICROSCOPIC ONLY Collection Time: 10/01/18  7:30 PM  
Result Value Ref Range WBC 2 to 4 0 - 5 /hpf  
 RBC 0 to 1 0 - 5 /hpf Epithelial cells 1+ 0 - 5 /lpf Bacteria FEW (A) NEG /hpf  
CBC WITH AUTOMATED DIFF Collection Time: 10/01/18  7:40 PM  
Result Value Ref Range WBC 9.0 4.6 - 13.2 K/uL  
 RBC 4.13 (L) 4.20 - 5.30 M/uL  
 HGB 12.2 12.0 - 16.0 g/dL HCT 36.4 35.0 - 45.0 % MCV 88.1 74.0 - 97.0 FL  
 MCH 29.5 24.0 - 34.0 PG  
 MCHC 33.5 31.0 - 37.0 g/dL  
 RDW 13.6 11.6 - 14.5 % PLATELET 971 252 - 358 K/uL MPV 9.8 9.2 - 11.8 FL  
 NEUTROPHILS 77 (H) 40 - 73 % LYMPHOCYTES 16 (L) 21 - 52 % MONOCYTES 6 3 - 10 % EOSINOPHILS 1 0 - 5 % BASOPHILS 0 0 - 2 %  
 ABS. NEUTROPHILS 7.0 1.8 - 8.0 K/UL  
 ABS. LYMPHOCYTES 1.5 0.9 - 3.6 K/UL  
 ABS. MONOCYTES 0.5 0.05 - 1.2 K/UL  
 ABS. EOSINOPHILS 0.1 0.0 - 0.4 K/UL  
 ABS. BASOPHILS 0.0 0.0 - 0.1 K/UL  
 DF AUTOMATED METABOLIC PANEL, COMPREHENSIVE Collection Time: 10/01/18  7:40 PM  
Result Value Ref Range Sodium 134 (L) 136 - 145 mmol/L Potassium 3.7 3.5 - 5.5 mmol/L Chloride 100 100 - 108 mmol/L  
 CO2 24 21 - 32 mmol/L Anion gap 10 3.0 - 18 mmol/L Glucose 91 74 - 99 mg/dL BUN 4 (L) 7.0 - 18 MG/DL Creatinine 0.47 (L) 0.6 - 1.3 MG/DL  
 BUN/Creatinine ratio 9 (L) 12 - 20 GFR est AA >60 >60 ml/min/1.73m2 GFR est non-AA >60 >60 ml/min/1.73m2 Calcium 8.9 8.5 - 10.1 MG/DL Bilirubin, total 0.3 0.2 - 1.0 MG/DL  
 ALT (SGPT) 26 13 - 56 U/L  
 AST (SGOT) 15 15 - 37 U/L Alk. phosphatase 70 45 - 117 U/L Protein, total 7.3 6.4 - 8.2 g/dL Albumin 3.0 (L) 3.4 - 5.0 g/dL Globulin 4.3 (H) 2.0 - 4.0 g/dL A-G Ratio 0.7 (L) 0.8 - 1.7 LIPASE Collection Time: 10/01/18  7:40 PM  
Result Value Ref Range Lipase 145 73 - 393 U/L MAGNESIUM Collection Time: 10/01/18  7:40 PM  
Result Value Ref Range Magnesium 1.8 1.6 - 2.6 mg/dL Radiologic Studies -  
US ABD LTD Final Result CT Results  (Last 48 hours) None CXR Results  (Last 48 hours) None Medications given in the ED- Medications  
sodium chloride 0.9 % bolus infusion 1,000 mL (1,000 mL IntraVENous New Bag 10/1/18 2039) famotidine (PF) (PEPCID) injection 20 mg (20 mg IntraVENous Given 10/1/18 2040) Medical Decision Making I am the first provider for this patient. I reviewed the vital signs, available nursing notes, past medical history, past surgical history, family history and social history. Vital Signs-Reviewed the patient's vital signs. Pulse Oximetry Analysis - 100% on RA Records Reviewed: Nursing Notes and Old Medical Records Provider Notes (Medical Decision Making): Appears well and non-toxic, presenting with post-prandial epigastric and RUQ abd pain with nausea and vomiting. Seen here this morning for the same. Labs are reassuring, abd exam not c/w acute or surgical abd, RUQ us unremarkable for cholelithiasis or cystitis. Suspect epi pain is 2/2 gastritis from n/v. Will advise pepcid, brat diet, OB follow up. Based on today's assessment, I feel the patient is stable for discharge to home with outpatient follow up. Return precautions and referrals provided. Procedures: 
Procedures ED Course:  
7:42 PM Initial assessment performed.  The patients presenting problems have been discussed, and they are in agreement with the care plan formulated and outlined with them. I have encouraged them to ask questions as they arise throughout their visit. 8:54 PM Patient is resting comfortably in bed. No episodes of emesis since being in the ED. Diagnosis and Disposition DISCHARGE NOTE: 
9:40 PM 
Brenden Billings's  results have been reviewed with her. She has been counseled regarding her diagnosis, treatment, and plan. She verbally conveys understanding and agreement of the signs, symptoms, diagnosis, treatment and prognosis and additionally agrees to follow up as discussed. She also agrees with the care-plan and conveys that all of her questions have been answered. I have also provided discharge instructions for her that include: educational information regarding their diagnosis and treatment, and list of reasons why they would want to return to the ED prior to their follow-up appointment, should her condition change. She has been provided with education for proper emergency department utilization. CLINICAL IMPRESSION: 
 
1. Abdominal pain, epigastric 2. Nausea and vomiting in pregnancy PLAN: 
1. D/C Home 2. Current Discharge Medication List  
  
START taking these medications Details  
famotidine (PEPCID) 20 mg tablet Take 1 Tab by mouth once over twenty-four (24) hours for 10 days. Qty: 10 Tab, Refills: 0  
  
acetaminophen (TYLENOL) 325 mg tablet Take 2 Tabs by mouth every four (4) hours as needed for Pain. Qty: 20 Tab, Refills: 0  
  
  
 
3. Follow-up Information Follow up With Details Comments Contact Info Ed Olivarez MD In 2 days For OB/GYN follow up [address] [facility] [address] [facility] 
[phone] Your OB/GYN Schedule an appointment as soon as possible for a visit in 2 days For [facility] Go to As needed, If symptoms worsen [address]  [id number] [phone]  
  
 
_______________________________ Attestations: This note is prepared by Stone Jones, acting as Scribe for Zeenat Wheatley PA-C. Zeenat Wheatley PA-C:  The scribe's documentation has been prepared under my direction and personally reviewed by me in its entirety. I confirm that the note above accurately reflects all work, treatment, procedures, and medical decision making performed by me. 
_______________________________

## 2018-10-01 NOTE — ED NOTES
Assume care of pt. Pt reports to be 16 wks pregnant. NV x 3 days. Son been sick at home with the same sxms.

## 2018-10-01 NOTE — ED TRIAGE NOTES
C/o abd pain and vomiting, seen here earlier today for same. Denies calling her doctor for f/u today.

## 2018-10-01 NOTE — ED NOTES
Urine specimen already at lab for processing. EDT Glory Comes has placed PIV access/collected blood for labs.

## 2018-10-02 NOTE — DISCHARGE INSTRUCTIONS
Dieta blanda de textura suave: Instrucciones de cuidado - [ Soft-Textured, [place] Diet: Care Instructions ]  Instrucciones de cuidado    [place] dieta blanda de textura suave se [place] cuando usted necesita alimentos que yuan fáciles de masticar, tragar y digerir. Deberá elegir alimentos blandos que no estén muy condimentados. Deberá evitar los alimentos altos en grasa, además de la cafeína y el alcohol. Crowe médico o dietista puede ayudarle a planificar gurwinder dieta blanda de textura [place] apropiada para crowe sameer y que sea de crowe gusto. Pregúntele a crowe médico por cuánto tiempo deberá seguir esta dieta. A medida que vaya mejorando, es probable que pueda regresar a crowe dieta habitual. Consulte a crowe médico o dietista antes de hacer cambios en crowe dieta. La atención de seguimiento es gurwinder parte clave de crowe tratamiento y seguridad. Asegúrese de hacer y acudir a todas las citas, y llame a crowe médico si está teniendo problemas. También es gurwinder buena idea saber los resultados de [place] exámenes y mantener gurwinder lista de los medicamentos que artemio. ¿Cómo puede cuidarse en el hogar? · Elija alimentos fáciles de masticar y tragar. Pamalee Jacob opciones son el puré de papa, el pan y los panecillos blandos, las sopas tipo crema, la marianne y [place] [address]" (crema de dian). · Elija verduras suaves y anthony cocidas, y frutas blandas o enlatadas. Algunas buenas opciones son el puré de Synchari, las bananas (plátanos) maduras y los jugos de frutas no cítricas. · Pruebe amanda Kabetogama, yogur u otros productos lácteos si puede digerirlos sin [employer]. Crowe médico podría limitar por un tiempo el consumo de Kabetogama y [place] derivados. Si es así, podría recomendarle un suplemento de calcio y vitamina D.  · Elija alimentos suaves con proteína, olivia los [place], el tofu, el pescado al vapor, el alex y Rai. Los métodos de cocción lenta, olivia guisar, ayudarán a suavizar la carne.  Moler la carne en un procesador de alimentos o gurwinder licuadora [place] hará que sea más fácil de comer. · Evite las nueces, las verduras crudas, las [address] duras y las flora duras, así olivia las ciruelas pasas y el jugo de ciruelas pasas. · Evite los alimentos muy condimentados, olivia los que se sazonan con filipe cecelia, chiles, rábano picante o salsa picante. · Evite los alimentos muy ácidos, olivia las frutas cítricas y [place] jugos, así olivia los alimentos que [place]. · Evite los alimentos de alto contenido de grasa, olivia la carne frita, los \"chips\" (tipo benitez fritas) y los postres pesados. · Consulte a crowe médico antes de consumir alcohol o bebidas que contengan cafeína, olivia el café, el té y las bebidas cola. ¿Dónde puede encontrar más información en inglés? Torie Tavarez a http://mele-peter.info/. Ed Crooked [id number] en la búsqueda para aprender más acerca de \"Dieta blanda de textura suave: Instrucciones de cuidado - [ Soft-Textured, Ruth Diet: Care Instructions ]. \"  Revisado: 12 bean, 2017  Versión del contenido: 11.7  © 8280-2781 Healthwise, Incorporated. Las instrucciones de cuidado fueron adaptadas bajo licencia por [employer] (which disclaims liability or warranty for this information). Si usted tiene [place] afección médica o sobre estas instrucciones, siempre pregunte a crowe profesional de sameer. Healthwise, Incorporated niega toda garantía o responsabilidad por crowe uso de esta información.

## 2019-03-26 ENCOUNTER — IP HISTORICAL/CONVERTED ENCOUNTER (OUTPATIENT)
Dept: OTHER | Age: 39
End: 2019-03-26

## 2020-08-10 ENCOUNTER — HOSPITAL ENCOUNTER (EMERGENCY)
Age: 40
Discharge: HOME | End: 2020-08-10
Attending: EMERGENCY MEDICINE | Admitting: EMERGENCY MEDICINE

## 2020-08-10 VITALS
RESPIRATION RATE: 16 BRPM | TEMPERATURE: 99 F | DIASTOLIC BLOOD PRESSURE: 76 MMHG | HEART RATE: 82 BPM | BODY MASS INDEX: 25.4 KG/M2 | OXYGEN SATURATION: 99 % | HEIGHT: 62 IN | SYSTOLIC BLOOD PRESSURE: 115 MMHG | WEIGHT: 138 LBS

## 2020-08-10 DIAGNOSIS — B34.9 VIRAL SYNDROME: Primary | ICD-10-CM

## 2020-08-10 LAB
APPEARANCE UR: CLEAR
BACTERIA URNS QL MICRO: NEGATIVE /HPF
BILIRUB UR QL: NEGATIVE
COLOR UR: ABNORMAL
EPITH CASTS URNS QL MICRO: ABNORMAL /LPF
GLUCOSE UR STRIP.AUTO-MCNC: NEGATIVE MG/DL
HCG UR QL: NEGATIVE
HGB UR QL STRIP: ABNORMAL
KETONES UR QL STRIP.AUTO: NEGATIVE MG/DL
LEUKOCYTE ESTERASE UR QL STRIP.AUTO: ABNORMAL
NITRITE UR QL STRIP.AUTO: NEGATIVE
PH UR STRIP: 7 [PH] (ref 5–8)
PROT UR STRIP-MCNC: NEGATIVE MG/DL
RBC #/AREA URNS HPF: ABNORMAL /HPF (ref 0–5)
SP GR UR REFRACTOMETRY: 1.01 (ref 1–1.03)
UR CULT HOLD, URHOLD: NORMAL
UROBILINOGEN UR QL STRIP.AUTO: 0.2 EU/DL (ref 0.2–1)
WBC URNS QL MICRO: ABNORMAL /HPF (ref 0–4)

## 2020-08-10 PROCEDURE — 81025 URINE PREGNANCY TEST: CPT

## 2020-08-10 PROCEDURE — 99282 EMERGENCY DEPT VISIT SF MDM: CPT

## 2020-08-10 PROCEDURE — 81001 URINALYSIS AUTO W/SCOPE: CPT

## 2020-08-10 RX ORDER — ACETAMINOPHEN 325 MG/1
650 TABLET ORAL
Qty: 20 TAB | Refills: 0 | Status: SHIPPED | OUTPATIENT
Start: 2020-08-10 | End: 2020-08-10 | Stop reason: ALTCHOICE

## 2020-08-10 RX ORDER — ACETAMINOPHEN 325 MG/1
650 TABLET ORAL
Qty: 20 TAB | Refills: 0 | Status: SHIPPED | OUTPATIENT
Start: 2020-08-10 | End: 2020-08-10 | Stop reason: CLARIF

## 2020-08-10 NOTE — ED TRIAGE NOTES
used in triage. Pt reports headache and lower abdominal pain x1 week. Also thinks she may have had fever off and on. Temp 99 in triage. Pt states she thought she was pregnant, took 3 tests, 2 negative and 1 positive. Denies vaginal bleeding.

## 2020-08-10 NOTE — DISCHARGE INSTRUCTIONS
Patient Education        Infecciones virales: Instrucciones de cuidado  Viral Infections: Care Instructions  Instrucciones de cuidado    Usted no se siente anthony, miroslava no está wilton qué lo está causando. Podría tener gurwinder infección viral. Los virus provocan muchas enfermedades, olivia el resfriado común, influenza, fiebre, salpullidos, y la diarrea, las náuseas y el vómito que suelen llamarse \"gastroenteritis viral\". Es posible que se pregunte si los medicamentos antibióticos pueden ayudarle a sentirse mejor. Miroslava los antibióticos tratan únicamente infecciones causadas por bacterias. No funcionan para los virus. La buena noticia es que las infecciones virales generalmente no son graves. La mayoría desaparecen en unos pocos días sin tratamiento médico. Mientras tanto, hay algunas cosas que usted puede hacer para estar más cómodo. La atención de seguimiento es gurwinder parte clave de crowe tratamiento y seguridad. Asegúrese de hacer y acudir a todas las citas, y llame a crowe médico si está teniendo problemas. También es gurwinder buena idea saber los resultados de [place] exámenes y mantener gurwinder lista de los medicamentos que artemio. ¿Cómo puede cuidarse en el hogar? · Descanse lo suficiente si se siente agotado. · [place] un analgésico (medicamento para el dolor) de venta mallory, olivia acetaminofén (Tylenol), ibuprofeno (Advil, Motrin) o naproxeno (Aleve), si es necesario. Fariha y siga todas las instrucciones de la [place]. · Tenga cuidado cuando tome medicamentos de venta mallory para el resfriado o la gripe y Tylenol al [employer]. Muchos de estos medicamentos contienen acetaminofén, o sea, Tylenol. Fariha las etiquetas para asegurarse de que no esté tomando gurwinder dosis mayor que la recomendada. El exceso de acetaminofén (Tylenol) puede ser dañino. · Yolie abundantes líquidos, suficientes para que crowe orina sea de color amarillo [place] o zeina olivia el agua.  Si tiene gurwinder enfermedad del [place] , del corazón o del hígado y tiene que Leti's líquidos, hable con crowe médico antes de aumentar crowe consumo. · Cuando tenga fiebre, no vaya al [place], a la escuela ni a otros lugares públicos. ¿Cuándo debe pedir ayuda? Llame [id number] en cualquier momento que considere que necesita atención de emergencia. Por ejemplo, llame si:  · Tiene dificultad grave para respirar. · Se desmayó (perdió el conocimiento). Llame a crowe médico ahora mismo o busque atención médica inmediata si:  · Le parece que está mucho más enfermo. · Tiene fiebre nueva o más mindy. · Tiene daysi en las heces. · Tiene dolor de estómago nuevo o que [facility]. · Tiene un nuevo salpullido. Preste especial atención a los cambios en crowe sameer y asegúrese de comunicarse con crowe médico si:  · Aislinn Slava a mejorar y Dardla Ryne. · No mejora olivia se esperaba. ¿Dónde puede encontrar más información en inglés? Vaya a http://mele-peter.info/  Maricruz [id number] en la búsqueda para aprender más acerca de \"Infecciones virales: Instrucciones de cuidado. \"  Revisado: 11 febrero, 2020               Versión del contenido: 12.5  © 8201-4188 Healthwise, Incorporated. Las instrucciones de cuidado fueron adaptadas bajo licencia por [employer] (which disclaims liability or warranty for this information). Si usted tiene [place] [facility] afección médica o sobre estas instrucciones, siempre pregunte a crowe profesional de sameer. Healthwise, Incorporated niega toda garantía o responsabilidad por crowe uso de esta información.

## 2020-08-10 NOTE — ED PROVIDER NOTES
Russ Loaiza from [address] [place] [address]  services was used at patient's request.  Patient is a 57-year-old female  who presents with question of pregnancy. She states her symptoms began 1 week ago with a dull headache, subjective fever (has not checked with thermometer), urinary urgency, urinary frequency and dark yellow urine. She states she took 4 pregnancy tests in the past week as recent as this morning and all were negative except 1 was positive from the other day. She states her last menstrual period was Rosa 3 with no vaginal bleeding whatsoever since. She denies flank pain, chills, vomiting, diarrhea, chest pain, shortness of breath and currently does not have a headache. Past Medical History:   Diagnosis Date    Cystitis     Hyperemesis gravidarum        No past surgical history on file. No family history on file.     Social History     Socioeconomic History    Marital status: SINGLE     Spouse name: Not on file    Number of children: Not on file    Years of education: Not on file    Highest education level: Not on file   Occupational History    Not on file   Social Needs    Financial resource strain: Not on file    Food insecurity     Worry: Not on file     Inability: Not on file    Transportation needs     Medical: Not on file     Non-medical: Not on file   Tobacco Use    Smoking status: Never Smoker    Smokeless tobacco: Never Used   Substance and Sexual Activity    Alcohol use: Not on file    Drug use: Not on file    Sexual activity: Not on file   Lifestyle    Physical activity     Days per week: Not on file     Minutes per session: Not on file    Stress: Not on file   Relationships    Social connections     Talks on phone: Not on file     Gets together: Not on file     Attends [employer] service: Not on file     Active member of club or organization: Not on file     Attends meetings of clubs or organizations: Not on file     Relationship status: Not on file  Intimate partner violence     Fear of current or ex partner: Not on file     Emotionally abused: Not on file     Physically abused: Not on file     Forced sexual activity: Not on file   Other Topics Concern    Not on file   Social History Narrative    Not on file         ALLERGIES: Patient has no known allergies. Review of Systems   Constitutional: Positive for fever (Subjective). Negative for activity change, chills, fatigue and unexpected weight change. HENT: Negative for trouble swallowing. Respiratory: Negative for cough, chest tightness, shortness of breath and wheezing. Cardiovascular: Negative. Negative for chest pain and palpitations. Gastrointestinal: Negative. Negative for abdominal pain, diarrhea, nausea and vomiting. Genitourinary: Positive for frequency and urgency. Negative for dysuria, flank pain and hematuria. Dark yellow urine   Musculoskeletal: Negative. Negative for arthralgias, back pain, neck pain and neck stiffness. Skin: Negative. Negative for color change and rash. Neurological: Negative. Negative for dizziness, numbness and headaches. All other systems reviewed and are negative. Vitals:    08/10/20 0846   BP: 115/76   Pulse: 82   Resp: 16   Temp: 99 °F (37.2 °C)   SpO2: 99%   Weight: 62.6 kg (138 lb)   Height: 5' 2\" (1.575 m)            Physical Exam  Vitals signs and nursing note reviewed. Constitutional:       General: She is not in acute distress. Appearance: She is well-developed. She is not toxic-appearing or diaphoretic. HENT:      Head: Normocephalic and atraumatic. Eyes:      General:         Right eye: No discharge. Left eye: No discharge. Conjunctiva/sclera: Conjunctivae normal.      Pupils: Pupils are equal, round, and reactive to light. Neck:      Musculoskeletal: Full passive range of motion without pain and normal range of motion. Trachea: No tracheal tenderness.    Cardiovascular:      Rate and Rhythm: Normal rate and regular rhythm. Pulses: Normal pulses. Heart sounds: Normal heart sounds. No murmur. No friction rub. No gallop. Pulmonary:      Effort: Pulmonary effort is normal. No respiratory distress. Breath sounds: Normal breath sounds. No wheezing or rales. Chest:      Chest wall: No tenderness. Abdominal:      General: Bowel sounds are normal. There is no distension. Palpations: Abdomen is soft. Tenderness: There is no abdominal tenderness. There is no guarding or rebound. Musculoskeletal: Normal range of motion. General: No tenderness. Skin:     General: Skin is warm and dry. Capillary Refill: Capillary refill takes less than 2 seconds. Findings: No abrasion, erythema or rash. Neurological:      Mental Status: She is alert and oriented to person, place, and time. Cranial Nerves: No cranial nerve deficit. Sensory: No sensory deficit. Coordination: Coordination normal.   Psychiatric:         Speech: Speech normal.         Behavior: Behavior normal.          MDM  Number of Diagnoses or Management Options  Diagnosis management comments:   Ddx: UTI, pregnancy, viral syndrome       Amount and/or Complexity of Data Reviewed  Clinical lab tests: ordered and reviewed  Review and summarize past medical records: yes           Procedures    Patient complains of suprapubic discomfort, urinary frequency and urgency for the past week, possible pregnancy due to amenorrhea since June. Will obtain urinalysis and urine pregnancy test.  She has no abdominal pain currently, abdomen soft and nontender, no CVA tenderness, she is afebrile. She has no respiratory symptoms. There is no indication for further work-up at this time based on HPI and exam findings. DISCHARGE NOTE:  Rajinder Carbajal with [facility]  service assisted in interpreting: The patient's results have been reviewed with them and/or available family.  Patient and/or family verbally conveyed their understanding and agreement of the patient's signs, symptoms, diagnosis, treatment and prognosis and additionally agree to follow up as recommended in the discharge instructions or to return to the Emergency Room should their condition change prior to their follow-up appointment. The patient/family verbally agrees with the care-plan and verbally conveys that all of their questions have been answered. The discharge instructions have also been provided to the patient and/or family with some educational information regarding the patient's diagnosis as well a list of reasons why the patient would want to return to the ER prior to their follow-up appointment, should their condition change. Plan:  1. F/U with pcp as needed  2. Declined COVID testing- agrees to self-quarantine  3.  Return precautions discussed and advised to return to ER if worse

## 2020-08-10 NOTE — PROGRESS NOTES
[employer]    Provided remote interpreting  to pt and RN, Melisa Jarvis during triage.     Linda Sims  [phone]

## 2020-08-10 NOTE — PROGRESS NOTES
Provided  services remotely to [facility] and patient during assessment.             [employer]  [phone]

## 2022-12-16 ENCOUNTER — TELEPHONE (OUTPATIENT)
Dept: FAMILY PLANNING/WOMEN'S HEALTH CLINIC | Age: 42
End: 2022-12-16

## 2023-02-28 ENCOUNTER — TRANSCRIBE ORDER (OUTPATIENT)
Dept: MAMMOGRAPHY | Age: 43
End: 2023-02-28

## 2023-02-28 DIAGNOSIS — Z12.31 VISIT FOR SCREENING MAMMOGRAM: Primary | ICD-10-CM

## 2023-03-01 ENCOUNTER — HOSPITAL ENCOUNTER (OUTPATIENT)
Dept: MAMMOGRAPHY | Age: 43
Discharge: HOME | End: 2023-03-01
Attending: FAMILY MEDICINE

## 2023-03-01 ENCOUNTER — OFFICE VISIT (OUTPATIENT)
Dept: FAMILY PLANNING/WOMEN'S HEALTH CLINIC | Age: 43
End: 2023-03-01

## 2023-03-01 DIAGNOSIS — R22.32 AXILLARY MASS, LEFT: ICD-10-CM

## 2023-03-01 DIAGNOSIS — R22.32 AXILLARY MASS, LEFT: Primary | ICD-10-CM

## 2023-03-01 DIAGNOSIS — N76.0 ACUTE VAGINITIS: ICD-10-CM

## 2023-03-01 PROCEDURE — 76882 US LMTD JT/FCL EVL NVASC XTR: CPT

## 2023-03-01 PROCEDURE — 77062 BREAST TOMOSYNTHESIS BI: CPT

## 2023-03-01 RX ORDER — METRONIDAZOLE 500 MG/1
500 TABLET ORAL 2 TIMES DAILY
Qty: 14 TABLET | Refills: 0 | Status: SHIPPED | OUTPATIENT
Start: 2023-03-01 | End: 2023-03-08

## 2023-03-01 RX ORDER — ETONOGESTREL 68 MG/1
IMPLANT SUBCUTANEOUS
COMMUNITY

## 2023-03-01 NOTE — PROGRESS NOTES
EVERY WOMANS LIFE HISTORY QUESTIONNAIRE       No Yes Comments   Has a doctor ever seen or felt anything wrong with your breast? [x]                                  []                                     Have you ever had a breast biopsy? [x]                                  []                                          When and where was last mammogram performed? 3 years ago in [facility]; had this done because she was afraid but she was not having any problems. She had this done at an outpatient ambulatory service. She can not remember the name of this clinic. Have you ever been told that there was a problem on your mammogram?   No Yes Comments   [x]                                  []                                       Do you have breast implants? No Yes Comments   [x]                                  []                                       When was your last Pap test performed? She thinks after birth of last baby at [address] in October of 2021. She will have pelvic exam today. Have you ever had an abnormal Pap test?   No Yes Comments   [x]                                  []                                       Have you had a hysterectomy? No Yes Comments (why)   [x]                                  []                                       Have you been through menopause? No Yes Date of LMP   [x]                                  []                                       Did your mother take MILE? No Yes Unknown   [x]                                  []                                       Do you have a history of HIV exposure?   No Yes    [x]                                  []                                       Have you ever been diagnosed with any type of Cancer   No Yes Comments (type,when,where,type of treatment   [x]                                  []                                          Has a family member been diagnosed with breast or ovarian cancer? No Yes Comments (which family members, and type   [x]                                  []                                       Are you taking hormone replacement therapy (HRT)     No Yes Comments   [x]                                  []                                       How many times have you been pregnant? Number of live births ? Are you experiencing any of the following? No Yes Comments   Nipple Discharge [x]                                  []                                     Breast Lump/Masses [x]                                  []                                     Breast Skin Changes [x]                                  []                                          No Yes Comments   Vaginal Discharge []                                  [x]                                  She reports a discharge that is a little yellow/green and itchy. Abnormal/unusual vaginal bleeding [x]                                  []                                         Are you experiencing any other health problems? With her last pap told to repeat it in 5 years and she went to [facility] one month ago to ask if she should repeat pap early due to discharge. They told her no and to wait the 5 years. She was not treated for this discharge. They did not do a pelvic exam.         Age at first period? 14  Age at first Ladi Walls 12    Wt--135 lbs    [facility]  # [id number].  Kristine Cordero RN

## 2023-03-01 NOTE — PROGRESS NOTES
Arelis Ross is a 38 y.o. female   Chief Complaint   Patient presents with    Well Woman     Every Woman's Life           ASSESSMENT AND PLAN:    1. Axillary mass, left  Cystic mass palpated in left axilla, enlarging per pt. Will switch to diagnostic mammogram with an axillary US.  - [facility] 3D KEENAN W MAMMO BI DX INCL CAD; Future  - US BREAST AXILLA LT; Future    2. Acute vaginitis  - metroNIDAZOLE (FLAGYL) 500 mg tablet; Take 1 Tablet by mouth two (2) times a day for 7 days. Indications: an infection of the vagina called bacterial vaginosis  Dispense: 14 Tablet; Refill: 0          SUBJECTIVE:    HPI:  Scottnddarren Natalia Ashton Che is a 38 y.o. female who presents for breast cancer screening. She denies any breast masses, tenderness, nipple discharge or skin changes. She notes that she has a lump in her left armpit. She was told by another physician that is was normal, but there were no imaging studies. In the interim, it has increased in size. Mildly tender. She is UTD on pap, but has had an abnormal discharge with vulvovaginal irritation and itching for about 6 months. She has not tried an treatments. She would like a pelvic exam today. Review of Systems   Constitutional: Negative. Respiratory: Negative. Cardiovascular: Negative. Gastrointestinal: Negative. LMP  (LMP Unknown) Comment: Nexplanon  since August of 2022    Physical Exam  Constitutional:       General: She is not in acute distress. Appearance: Normal appearance. Pulmonary:      Effort: Pulmonary effort is normal.   Chest:   Breasts:     Breast asymmetry: axillary. .      Right: Normal. No inverted nipple, mass, nipple discharge, skin change or tenderness. Left: Mass (- cyst vs. enlarged lymph node?) present. No inverted nipple, nipple discharge or skin change. Genitourinary:     General: Normal vulva.       Vagina: Vaginal discharge (moderate amount of yellow tinged discharge with mucousy component  intertwined) present. Cervix: Friability present. No cervical motion tenderness. Lymphadenopathy:      Upper Body:      Right upper body: No supraclavicular, axillary or pectoral adenopathy. Left upper body: No supraclavicular or pectoral adenopathy. Neurological:      Mental Status: She is alert and oriented to person, place, and time.

## 2023-04-22 DIAGNOSIS — Z12.31 VISIT FOR SCREENING MAMMOGRAM: Primary | ICD-10-CM

## 2025-02-10 ENCOUNTER — HOSPITAL ENCOUNTER (OUTPATIENT)
Facility: HOSPITAL | Age: 45
Discharge: HOME | End: 2025-02-13

## 2025-02-10 ENCOUNTER — OFFICE VISIT (OUTPATIENT)
Age: 45
End: 2025-02-10

## 2025-02-10 VITALS — BODY MASS INDEX: 24.84 KG/M2 | HEIGHT: 62 IN | WEIGHT: 135 LBS

## 2025-02-10 DIAGNOSIS — Z12.31 VISIT FOR SCREENING MAMMOGRAM: ICD-10-CM

## 2025-02-10 DIAGNOSIS — Z01.419 ENCOUNTER FOR WELL WOMAN EXAM: Primary | ICD-10-CM

## 2025-02-10 PROCEDURE — 77063 BREAST TOMOSYNTHESIS BI: CPT

## 2025-02-10 RX ORDER — ETONOGESTREL 68 MG/1
IMPLANT SUBCUTANEOUS
COMMUNITY

## 2025-02-10 NOTE — PROGRESS NOTES
EVERY WOMANS LIFE HISTORY QUESTIONNAIRE     used  [] No    [x]   Yes    Ht--5'2    Wt--135 lbs    Do you have any breast concerns today?  ______    Are you experiencing any of the following?   No Yes Comments   Nipple Discharge [x]                                  []                                     Breast Lump/Masses []                                  [x]                                  Bilateral lumps for many years and was told they were cystic. They are painful.    Breast Skin Changes [x]                                  []                                           When and where was last mammogram performed?  ____[facility] 3/1/2023      No Yes Comments   Have you ever had a mammogram that required additional follow up?  [x]     []        Have you ever had a breast biopsy? [x]                                  []                                     Do you have breast implants?  [x]        []                When and where was your last Pap test performed? ____ She thinks she had a pap in the fall or 2021. She thinks pap was good for 5 years. She would like a  pap because she has some concerns due to rashes in her private area. I explained to her that any external rash is not something related to any pap needs. She will discuss with provider today.     Have you ever had an abnormal Pap test? ( Please note, if Hx of Colposcopy, LEEP, CKC, MIKEY 2 or 3)  No Yes Comments   [x]                                  []                                       Have you been through menopause?   No Yes Date of LMP   [x]                                  []                                  2/8/2025     For patients who are still menstruating: Do you use any form of family planning? None    Have you had a hysterectomy?   No Yes Comments (why)   [x]                                  []                                        No Yes Comments   Vaginal Discharge []                                  [x]

## 2025-02-10 NOTE — PROGRESS NOTES
Assessment/Plan:    Kennedi was seen today for annual exam.    Diagnoses and all orders for this visit:    Encounter for well woman exam        No follow-up provider specified.    Mayda Santos PA-C  Kennedi GREER Lauro Betancur expressed understanding of this plan. An AVS was printed and given to the patient.      ----------------------------------------------------------------------    Chief Complaint   Patient presents with    Annual Exam     Every Woman's Life program         History of Present Illness:  [facility] annual well woman visit     Having irregular periods, on period today. Has some external vaginal itching. Advised her to establish care at [facility]  She is UTD on pap, due in , explained what the pap test is for and how it is not used to assist in dx'ing other genital conditions  She has no breast concerns or complaints  She has no risk for DV       Past Medical History:   Diagnosis Date    Cystitis     Hyperemesis gravidarum        Current Outpatient Medications   Medication Sig Dispense Refill    etonogestrel (NEXPLANON) 68 MG implant Inject into the skin      Amoxicillin 500 MG TABS Take 500 mg by mouth 3 times daily (Patient not taking: Reported on 2/10/2025)      ondansetron (ZOFRAN) 4 MG tablet Take 4 mg by mouth every 8 hours as needed (Patient not taking: Reported on 2/10/2025)      promethazine (PHENERGAN) 25 MG tablet Take 25 mg by mouth every 6 hours as needed (Patient not taking: Reported on 2/10/2025)       No current facility-administered medications for this visit.       No Known Allergies    Social History     Tobacco Use    Smoking status: Never    Smokeless tobacco: Never       No family history on file.    Physical Exam:     LMP 2025 (Exact Date)     A&Ox3  WDWN NAD  Respirations normal and non labored  Breast exam- gia neg for mass, tenderness, skin color changes, dimpling or retractions

## 2025-06-26 ENCOUNTER — HOSPITAL ENCOUNTER (OUTPATIENT)
Facility: HOSPITAL | Age: 45
Setting detail: SPECIMEN
Discharge: HOME | End: 2025-06-29

## 2025-06-26 PROCEDURE — 85025 COMPLETE CBC W/AUTO DIFF WBC: CPT

## 2025-06-26 PROCEDURE — 83036 HEMOGLOBIN GLYCOSYLATED A1C: CPT

## 2025-06-26 PROCEDURE — 80053 COMPREHEN METABOLIC PANEL: CPT

## 2025-06-26 PROCEDURE — 80061 LIPID PANEL: CPT

## 2025-06-27 LAB
ALBUMIN SERPL-MCNC: 3.9 G/DL (ref 3.5–5)
ALBUMIN/GLOB SERPL: 1.2 (ref 1.1–2.2)
ALP SERPL-CCNC: 89 U/L (ref 45–117)
ALT SERPL-CCNC: 28 U/L (ref 12–78)
ANION GAP SERPL CALC-SCNC: 6 MMOL/L (ref 2–12)
AST SERPL-CCNC: 22 U/L (ref 15–37)
BASOPHILS # BLD: 0.07 K/UL (ref 0–0.1)
BASOPHILS NFR BLD: 0.7 % (ref 0–1)
BILIRUB SERPL-MCNC: 0.4 MG/DL (ref 0.2–1)
BUN SERPL-MCNC: 8 MG/DL (ref 6–20)
BUN/CREAT SERPL: 15 (ref 12–20)
CALCIUM SERPL-MCNC: 9.4 MG/DL (ref 8.5–10.1)
CHLORIDE SERPL-SCNC: 103 MMOL/L (ref 97–108)
CHOLEST SERPL-MCNC: 194 MG/DL
CO2 SERPL-SCNC: 25 MMOL/L (ref 21–32)
CREAT SERPL-MCNC: 0.54 MG/DL (ref 0.55–1.02)
DIFFERENTIAL METHOD BLD: NORMAL
EOSINOPHIL # BLD: 0.16 K/UL (ref 0–0.4)
EOSINOPHIL NFR BLD: 1.7 % (ref 0–7)
ERYTHROCYTE [DISTWIDTH] IN BLOOD BY AUTOMATED COUNT: 13.5 % (ref 11.5–14.5)
EST. AVERAGE GLUCOSE BLD GHB EST-MCNC: 100 MG/DL
GLOBULIN SER CALC-MCNC: 3.3 G/DL (ref 2–4)
GLUCOSE SERPL-MCNC: 73 MG/DL (ref 65–100)
HBA1C MFR BLD: 5.1 % (ref 4–5.6)
HCT VFR BLD AUTO: 43.5 % (ref 35–47)
HDLC SERPL-MCNC: 61 MG/DL
HDLC SERPL: 3.2 (ref 0–5)
HGB BLD-MCNC: 13.4 G/DL (ref 11.5–16)
IMM GRANULOCYTES # BLD AUTO: 0.04 K/UL (ref 0–0.04)
IMM GRANULOCYTES NFR BLD AUTO: 0.4 % (ref 0–0.5)
LDLC SERPL CALC-MCNC: 98.4 MG/DL (ref 0–100)
LYMPHOCYTES # BLD: 2.4 K/UL (ref 0.8–3.5)
LYMPHOCYTES NFR BLD: 25.4 % (ref 12–49)
MCH RBC QN AUTO: 29.4 PG (ref 26–34)
MCHC RBC AUTO-ENTMCNC: 30.8 G/DL (ref 30–36.5)
MCV RBC AUTO: 95.4 FL (ref 80–99)
MONOCYTES # BLD: 0.59 K/UL (ref 0–1)
MONOCYTES NFR BLD: 6.2 % (ref 5–13)
NEUTS SEG # BLD: 6.19 K/UL (ref 1.8–8)
NEUTS SEG NFR BLD: 65.6 % (ref 32–75)
NRBC # BLD: 0 K/UL (ref 0–0.01)
NRBC BLD-RTO: 0 PER 100 WBC
PLATELET # BLD AUTO: 330 K/UL (ref 150–400)
PMV BLD AUTO: 11.6 FL (ref 8.9–12.9)
POTASSIUM SERPL-SCNC: 4.1 MMOL/L (ref 3.5–5.1)
PROT SERPL-MCNC: 7.2 G/DL (ref 6.4–8.2)
RBC # BLD AUTO: 4.56 M/UL (ref 3.8–5.2)
SODIUM SERPL-SCNC: 134 MMOL/L (ref 136–145)
TRIGL SERPL-MCNC: 173 MG/DL
VLDLC SERPL CALC-MCNC: 34.6 MG/DL
WBC # BLD AUTO: 9.5 K/UL (ref 3.6–11)